# Patient Record
Sex: MALE | NOT HISPANIC OR LATINO | Employment: UNEMPLOYED
[De-identification: names, ages, dates, MRNs, and addresses within clinical notes are randomized per-mention and may not be internally consistent; named-entity substitution may affect disease eponyms.]

---

## 2017-09-19 ENCOUNTER — PRIOR ORIGINAL RECORDS (OUTPATIENT)
Dept: OTHER | Age: 37
End: 2017-09-19

## 2017-12-12 ENCOUNTER — PRIOR ORIGINAL RECORDS (OUTPATIENT)
Dept: OTHER | Age: 37
End: 2017-12-12

## 2017-12-31 ENCOUNTER — PRIOR ORIGINAL RECORDS (OUTPATIENT)
Dept: OTHER | Age: 37
End: 2017-12-31

## 2018-09-18 ENCOUNTER — PRIOR ORIGINAL RECORDS (OUTPATIENT)
Dept: OTHER | Age: 38
End: 2018-09-18

## 2018-12-31 ENCOUNTER — PRIOR ORIGINAL RECORDS (OUTPATIENT)
Dept: OTHER | Age: 38
End: 2018-12-31

## 2019-10-11 ENCOUNTER — PRIOR ORIGINAL RECORDS (OUTPATIENT)
Dept: OTHER | Age: 39
End: 2019-10-11

## 2019-12-31 ENCOUNTER — PRIOR ORIGINAL RECORDS (OUTPATIENT)
Dept: OTHER | Age: 39
End: 2019-12-31

## 2020-10-09 LAB
ALBUMIN/GLOB SERPL: 1.8 (CALC) (ref 1–2.5)
ALBUMIN/GLOB SERPL: 1.8 (CALC) (ref 1–2.5)
ALBUMIN/GLOB SERPL: 2 (CALC) (ref 1–2.5)
ALBUMIN: 4.3 G/DL (ref 3.6–5.1)
ALBUMIN: 4.4 G/DL (ref 3.6–5.1)
ALBUMIN: 4.7 G/DL (ref 3.6–5.1)
ALKALINE PHOSPHATASE: 119 UNIT/L (ref 40–115)
ALKALINE PHOSPHATASE: 40 UNIT/L (ref 40–115)
ALKALINE PHOSPHATASE: 83 UNIT/L (ref 40–115)
ALT: 13 UNIT/L (ref 9–46)
ALT: 43 UNIT/L (ref 9–46)
ALT: 8 UNIT/L (ref 9–46)
AST: 11 UNIT/L (ref 10–40)
AST: 17 UNIT/L (ref 10–40)
AST: 32 UNIT/L (ref 10–40)
BASO%: 0.5 %
BASO%: 0.6 %
BASO%: 0.8 %
BASO%: 1.1 %
BASO: 0 10^3/UL
BILIRUBIN, TOTAL: 0.6 MG/DL (ref 0.2–1.2)
BILIRUBIN, TOTAL: 0.7 MG/DL (ref 0.2–1.2)
BILIRUBIN, TOTAL: 1 MG/DL (ref 0.2–1.2)
BUN/CREATININE RATIO: ABNORMAL (CALC) (ref 6–22)
BUN/CREATININE RATIO: ABNORMAL (CALC) (ref 6–22)
BUN/CREATININE RATIO: NORMAL (CALC) (ref 6–22)
CALCIUM: 9.4 MG/DL (ref 8.6–10.3)
CALCIUM: 9.5 MG/DL (ref 8.6–10.3)
CALCIUM: 9.7 MG/DL (ref 8.6–10.3)
CARBON DIOXIDE: 23 MMOL/L (ref 20–32)
CARBON DIOXIDE: 23 MMOL/L (ref 20–32)
CARBON DIOXIDE: 26 MMOL/L (ref 20–31)
CHLORIDE: 103 MMOL/L (ref 98–110)
CHLORIDE: 104 MMOL/L (ref 98–110)
CHLORIDE: 106 MMOL/L (ref 98–110)
CRCL (C&G) (MOSAIQ HL): 88.71 ML/MIN
CRCL (C&G) (MOSAIQ HL): 92.5 ML/MIN
CRCL (C&G) (MOSAIQ HL): 94.75 ML/MIN
CREATININE CLEARANCE (MOSAIQ HL): 83.7 ML/MIN
CREATININE CLEARANCE (MOSAIQ HL): 90 ML/MIN
CREATININE CLEARANCE (MOSAIQ HL): 91.9 ML/MIN
CREATININE: 0.91 MG/DL (ref 0.6–1.35)
CREATININE: 0.92 MG/DL (ref 0.6–1.35)
CREATININE: 0.98 MG/DL (ref 0.6–1.35)
EGFR AFRICAN AMERICAN: 111 ML/MIN/1.73M2
EGFR AFRICAN AMERICAN: 121 ML/MIN/1.73M2
EGFR AFRICAN AMERICAN: 123 ML/MIN/1.73M2
EGFR NON-AFR. AMERICAN: 104 ML/MIN/1.73M2
EGFR NON-AFR. AMERICAN: 107 ML/MIN/1.73M2
EGFR NON-AFR. AMERICAN: 96 ML/MIN/1.73M2
EOS%: 1.1 %
EOS%: 1.5 %
EOS%: 3 %
EOS%: 3.8 %
EOS: 0 10^3/UL
EOS: 0.1 10^3/UL
EOS: 0.1 10^3/UL
EOS: 0.2 10^3/UL
GLOBULIN: 2.4 G/DL (CALC) (ref 1.9–3.7)
GLUCOSE: 78 MG/DL (ref 65–99)
GLUCOSE: 90 MG/DL (ref 65–99)
GLUCOSE: 91 MG/DL (ref 65–99)
HCT: 35.3 % (ref 38–54)
HCT: 40.5 % (ref 38–54)
HCT: 40.8 % (ref 38–54)
HCT: 41.1 % (ref 38–54)
HGB: 12.3 G/DL (ref 12–18)
HGB: 13.8 G/DL (ref 12–18)
HGB: 13.9 G/DL (ref 12–18)
HGB: 14.2 G/DL (ref 12–18)
IG A: 141 MG/DL (ref 47–310)
IG G: 1157 MG/DL (ref 600–1640)
IG M: 101 MG/DL (ref 50–300)
LYMPH%: 34.7 % (ref 12–44)
LYMPH%: 36 % (ref 12–44)
LYMPH%: 39.8 % (ref 12–44)
LYMPH%: 43.6 % (ref 12–44)
LYMPH: 1.2 10^3/UL (ref 0.8–2.8)
LYMPH: 1.3 10^3/UL (ref 0.8–2.8)
LYMPH: 1.5 10^3/UL (ref 0.8–2.8)
LYMPH: 1.7 10^3/UL (ref 0.8–2.8)
MCH: 30.1 PG (ref 26–33)
MCH: 30.5 PG (ref 26–33)
MCH: 30.5 PG (ref 26–33)
MCH: 30.8 PG (ref 26–33)
MCHC: 33.8 G/DL (ref 31–36)
MCHC: 33.8 G/DL (ref 31–36)
MCHC: 34.8 G/DL (ref 31–36)
MCHC: 35.1 G/DL (ref 31–36)
MCV: 87.1 FML (ref 82–100)
MCV: 87.6 FML (ref 82–100)
MCV: 89.1 FML (ref 82–100)
MCV: 90.9 FML (ref 82–100)
MONO%: 7.1 % (ref 2–12)
MONO%: 7.6 % (ref 2–12)
MONO%: 8.1 % (ref 2–12)
MONO%: 8.7 % (ref 2–12)
MONO: 0.2 10^3/UL (ref 0.2–1)
MONO: 0.3 10^3/UL (ref 0.2–1)
MPV: 11.8 FML (ref 8.6–11.7)
MPV: 8.9 FML (ref 8.6–11.7)
MPV: 9.8 FML (ref 8.6–11.7)
MPV: 9.9 FML (ref 8.6–11.7)
NEUT%: 44.5 % (ref 47–76)
NEUT%: 48 % (ref 47–76)
NEUT%: 54.7 % (ref 47–76)
NEUT%: 54.8 % (ref 47–76)
NEUT: 1.8 10^3/UL (ref 1.5–7.1)
NEUT: 2.1 10^3/UL (ref 1.5–7.1)
PLT: 111 10^3/UL (ref 150–375)
PLT: 194 10^3/UL (ref 150–375)
PLT: 203 10^3/UL (ref 150–375)
PLT: 246 10^3/UL (ref 150–375)
POTASSIUM: 3.8 MMOL/L (ref 3.5–5.3)
POTASSIUM: 4 MMOL/L (ref 3.5–5.3)
POTASSIUM: 4.7 MMOL/L (ref 3.5–5.3)
PROTEIN, TOTAL: 6.7 G/DL (ref 6.1–8.1)
PROTEIN, TOTAL: 6.8 G/DL (ref 6.1–8.1)
PROTEIN, TOTAL: 7.1 G/DL (ref 6.1–8.1)
RBC: 4.03 10^6/UL (ref 4.2–6.2)
RBC: 4.52 10^6/UL (ref 4.2–6.2)
RBC: 4.58 10^6/UL (ref 4.2–6.2)
RBC: 4.65 10^6/UL (ref 4.2–6.2)
RDW-CV: 11.4 %
RDW-CV: 11.7 %
RDW-CV: 12.3 %
RDW-CV: 12.4 %
RDW-SD: 36.4 FML (ref 36–50)
RDW-SD: 37.2 FML (ref 36–50)
RDW-SD: 39.1 FML (ref 36–50)
RDW-SD: 40.6 FML (ref 36–50)
SODIUM: 140 MMOL/L (ref 135–146)
UREA NITROGEN (BUN): 14 MG/DL (ref 7–25)
UREA NITROGEN (BUN): 15 MG/DL (ref 7–25)
UREA NITROGEN (BUN): 17 MG/DL (ref 7–25)
VITAMIN D, 25-OH, TOTAL: 34 NG/ML (ref 30–100)
WBC: 3.3 10^3/UL (ref 4.3–11)
WBC: 3.7 10^3/UL (ref 4.3–11)
WBC: 3.8 10^3/UL (ref 4.3–11)
WBC: 4 10^3/UL (ref 4.3–11)

## 2020-10-11 VITALS
BODY MASS INDEX: 20.56 KG/M2 | WEIGHT: 131 LBS | SYSTOLIC BLOOD PRESSURE: 100 MMHG | HEIGHT: 67 IN | DIASTOLIC BLOOD PRESSURE: 64 MMHG

## 2020-10-11 VITALS
SYSTOLIC BLOOD PRESSURE: 120 MMHG | HEIGHT: 67 IN | DIASTOLIC BLOOD PRESSURE: 78 MMHG | WEIGHT: 136.99 LBS | BODY MASS INDEX: 21.5 KG/M2

## 2020-10-11 VITALS
SYSTOLIC BLOOD PRESSURE: 119 MMHG | HEIGHT: 67 IN | WEIGHT: 138.01 LBS | BODY MASS INDEX: 21.66 KG/M2 | DIASTOLIC BLOOD PRESSURE: 72 MMHG

## 2020-10-20 ENCOUNTER — LAB SERVICES (OUTPATIENT)
Dept: HEMATOLOGY/ONCOLOGY | Age: 40
End: 2020-10-20
Attending: INTERNAL MEDICINE

## 2020-10-20 ENCOUNTER — OFFICE VISIT (OUTPATIENT)
Dept: HEMATOLOGY/ONCOLOGY | Age: 40
End: 2020-10-20
Attending: INTERNAL MEDICINE

## 2020-10-20 VITALS
HEART RATE: 60 BPM | WEIGHT: 149 LBS | BODY MASS INDEX: 23.34 KG/M2 | SYSTOLIC BLOOD PRESSURE: 119 MMHG | DIASTOLIC BLOOD PRESSURE: 68 MMHG

## 2020-10-20 DIAGNOSIS — D70.0 CONGENITAL NEUTROPENIA (CMD): Primary | ICD-10-CM

## 2020-10-20 PROBLEM — D72.819 LEUKOPENIA: Status: ACTIVE | Noted: 2020-10-20

## 2020-10-20 PROCEDURE — 36415 COLL VENOUS BLD VENIPUNCTURE: CPT

## 2020-10-20 PROCEDURE — 99214 OFFICE O/P EST MOD 30 MIN: CPT | Performed by: INTERNAL MEDICINE

## 2020-10-20 PROCEDURE — 85025 COMPLETE CBC W/AUTO DIFF WBC: CPT

## 2020-10-20 RX ORDER — PANTOPRAZOLE SODIUM 40 MG/1
TABLET, DELAYED RELEASE ORAL
COMMUNITY
Start: 2020-09-30 | End: 2022-10-18 | Stop reason: ALTCHOICE

## 2020-10-20 SDOH — HEALTH STABILITY: MENTAL HEALTH: HOW MANY STANDARD DRINKS CONTAINING ALCOHOL DO YOU HAVE ON A TYPICAL DAY?: 1 OR 2

## 2020-10-20 SDOH — HEALTH STABILITY: MENTAL HEALTH: HOW OFTEN DO YOU HAVE A DRINK CONTAINING ALCOHOL?: MONTHLY OR LESS

## 2020-10-20 SDOH — HEALTH STABILITY: MENTAL HEALTH: HOW OFTEN DO YOU HAVE 6 OR MORE DRINKS ON ONE OCCASION?: NEVER

## 2020-10-20 ASSESSMENT — PATIENT HEALTH QUESTIONNAIRE - PHQ9
1. LITTLE INTEREST OR PLEASURE IN DOING THINGS: NOT AT ALL
SUM OF ALL RESPONSES TO PHQ9 QUESTIONS 1 AND 2: 0
CLINICAL INTERPRETATION OF PHQ9 SCORE: NO FURTHER SCREENING NEEDED
2. FEELING DOWN, DEPRESSED OR HOPELESS: NOT AT ALL
CLINICAL INTERPRETATION OF PHQ2 SCORE: NO FURTHER SCREENING NEEDED
SUM OF ALL RESPONSES TO PHQ9 QUESTIONS 1 AND 2: 0

## 2020-10-20 ASSESSMENT — ENCOUNTER SYMPTOMS
ABDOMINAL DISTENTION: 0
DIZZINESS: 0
APPETITE CHANGE: 0
DIARRHEA: 0
WHEEZING: 0
CONFUSION: 0
SPEECH DIFFICULTY: 0
LIGHT-HEADEDNESS: 0
CHEST TIGHTNESS: 0
CHOKING: 0
DIAPHORESIS: 0
CHILLS: 0
HEADACHES: 0
ACTIVITY CHANGE: 0
ADENOPATHY: 0
FEVER: 0
APNEA: 0
CONSTIPATION: 0
VOICE CHANGE: 0
BACK PAIN: 0
VOMITING: 0
BLOOD IN STOOL: 0
WEAKNESS: 0
SHORTNESS OF BREATH: 0
BRUISES/BLEEDS EASILY: 0
ABDOMINAL PAIN: 0
SLEEP DISTURBANCE: 0
TROUBLE SWALLOWING: 0
NAUSEA: 0
COUGH: 1
FATIGUE: 0
UNEXPECTED WEIGHT CHANGE: 0

## 2020-10-21 LAB
ALBUMIN SERPL-MCNC: 4.5 G/DL (ref 3.6–5.1)
ALBUMIN/GLOB SERPL: 1.4 {RATIO} (ref 1–2.4)
ALP SERPL-CCNC: 71 UNITS/L (ref 45–117)
ALT SERPL-CCNC: 22 UNITS/L
ANION GAP SERPL CALC-SCNC: 10 MMOL/L (ref 10–20)
AST SERPL-CCNC: 22 UNITS/L
BILIRUB SERPL-MCNC: 0.5 MG/DL (ref 0.2–1)
BUN SERPL-MCNC: 20 MG/DL (ref 6–20)
BUN/CREAT SERPL: 22 (ref 7–25)
CALCIUM SERPL-MCNC: 9.3 MG/DL (ref 8.4–10.2)
CHLORIDE SERPL-SCNC: 107 MMOL/L (ref 98–107)
CO2 SERPL-SCNC: 26 MMOL/L (ref 21–32)
CREAT SERPL-MCNC: 0.91 MG/DL (ref 0.67–1.17)
GLOBULIN SER-MCNC: 3.2 G/DL (ref 2–4)
GLUCOSE SERPL-MCNC: 77 MG/DL (ref 65–99)
LENGTH OF FAST TIME PATIENT: NORMAL HRS
POTASSIUM SERPL-SCNC: 4.4 MMOL/L (ref 3.4–5.1)
PROT SERPL-MCNC: 7.7 G/DL (ref 6.4–8.2)
SODIUM SERPL-SCNC: 139 MMOL/L (ref 135–145)

## 2021-06-19 ENCOUNTER — HOSPITAL ENCOUNTER (OUTPATIENT)
Dept: CT IMAGING | Facility: HOSPITAL | Age: 41
Discharge: HOME | End: 2021-06-19
Attending: INTERNAL MEDICINE
Payer: MEDICARE

## 2021-06-19 DIAGNOSIS — R05.9 COUGH: ICD-10-CM

## 2021-06-19 PROCEDURE — 71250 CT THORAX DX C-: CPT | Performed by: INTERNAL MEDICINE

## 2021-06-22 NOTE — PROGRESS NOTES
Please let pt know that his CT scan is completely normal.  Has he noted any change/improvement in his symptoms since we saw each other in the office?   thanks

## 2021-06-24 NOTE — PROGRESS NOTES
Pt notified of results, states symptoms have not changed or improved since LOV.  Please advise on next steps

## 2021-06-24 NOTE — PROGRESS NOTES
Let's have the patient get a CPET through [facility] or [facility][note 2] whichever he prefers.   thanks

## 2021-06-25 ENCOUNTER — TELEPHONE (OUTPATIENT)
Dept: CARDIOLOGY | Age: 41
End: 2021-06-25

## 2021-06-25 ENCOUNTER — OFF PREMISE (OUTPATIENT)
Dept: CARDIOLOGY | Age: 41
End: 2021-06-25

## 2021-06-25 DIAGNOSIS — R05.9 COUGH: Primary | ICD-10-CM

## 2021-06-25 NOTE — PROGRESS NOTES
Spoke with pt: instructed per provider's orders and comments. He verbalized understanding, was agreeable to plan and prefers to go to [facility] location. Instructed on scheduling info for [facility] to have Cardiopulm stress test done.   Order with last OV not

## 2021-07-28 ENCOUNTER — ANCILLARY PROCEDURE (OUTPATIENT)
Dept: CARDIOLOGY | Age: 41
End: 2021-07-28
Attending: INTERNAL MEDICINE

## 2021-07-28 VITALS — HEIGHT: 67 IN | BODY MASS INDEX: 23.39 KG/M2 | WEIGHT: 149 LBS

## 2021-07-28 DIAGNOSIS — R05.9 COUGH: ICD-10-CM

## 2021-07-28 PROCEDURE — 94621 CARDIOPULM EXERCISE TESTING: CPT | Performed by: INTERNAL MEDICINE

## 2021-07-28 ASSESSMENT — EXERCISE STRESS TEST
STAGE_CATEGORIES: 3
COMMENTS: RPE:16
GRADE: 4
PEAK_BP: 120/70
PEAK_RPP: 23520
MPH: 5.4
GRADE: 2
STAGE_CATEGORIES: 1
PEAK_HR: 146
PEAK_RPP: 6960
PEAK_HR: 83
GRADE: 6
STAGE_CATEGORIES: RECOVERY 1
STAGE_CATEGORIES: RECOVERY 2
PEAK_METS: 10.1
COMMENTS: RPE:19
PEAK_HR: 90
PEAK_HR: 93
PEAK_BP: 160/74
MPH: 3
PEAK_HR: 146
PEAK_HR: 140
PEAK_BP: 140/70
STAGE_CATEGORIES: 4
PEAK_RPP: 9130
STAGE_CATEGORIES: RECOVERY 0
PEAK_RPP: 14880
PEAK_HR: 103
PEAK_BP: 110/70
STAGE_CATEGORIES: 2
PEAK_BP: 168/82
PEAK_O2_SAT: 98
PEAK_HR: 58
STAGE_CATEGORIES: RESTING
STOPPAGE_REASON: GENERAL FATIGUE
PEAK_RPP: 14420
PEAK_BP: 120/64
COMMENTS: RPE:13
MPH: 4.2
PEAK_RPP: 10800
PEAK_O2_SAT: 98

## 2021-08-04 LAB
BODY MASS INDEX: 23.5
BREATHING RESERVE (CALCULATED) PREDICTED: 38.93 %
BREATHING RESERVE (MEASURED) ACHIEVED: 48.6 %
CHANGE VO2/ CHANGE WR ACHIEVED: 18 ML/MIN/WATT
CHRONOTROPIC INDEX PREDICTED: 0.81
HEART RATE RESERVE PREDICTED: 17.98 BPM
HEIGHT: 170 CM
IDEAL BODY WEIGHT: 74 KG
METS ACHIEVED: 10.1
O2 SATURATION ACHIEVED: 97 %
OUES ACHIEVED: 80
PEAK HR ACHIEVED: 146 BPM
PEAK HR PREDICTED: 178 BPM
PEAK O2 PULSE (%) PREDICTED: 110.14 %
PEAK O2 PULSE (ML/BEAT) ACHIEVED: 16.4 ML/BEAT
PEAK O2 PULSE (ML/BEAT) PREDICTED: 14.89 ML/BEAT
PEAK RESPIRATORY RATE ACHIEVED: 26 BRS/MIN
PEAK VE ACHIEVED: 80 L/MIN
PECO2 ACHIEVED: 32.5 MMHG
PECO2/PETCO2 AT PREDICTED: 81.25 %
PETCO2 ACHIEVED: 40 MMHG
PREDICTED VO2 % AT AT: 43.33 %
PREDICTED VO2 %: 90.26 %
RER MAX ACHIEVED: 1.17
RESTING HR ACHIEVED: 61 BPM
RESTING MVV: 131 L/MIN
STRESS BASELINE BP: NORMAL MMHG
STRESS O2 SAT REST: 98 %
STRESS PEAK HR: 146 BPM
STRESS PERCENT HR: 82 %
STRESS POST ESTIMATED WORKLOAD: 10.1 METS
STRESS POST EXERCISE DUR MIN: 6 MIN
STRESS POST EXERCISE DUR SEC: 28 SEC
STRESS POST O2 SAT PEAK: 97 %
STRESS POST PEAK BP: NORMAL MMHG
STRESS TARGET HR: 178 BPM
VD/VT ACHIEVED: 0.24
VE/VCO2 AT ACHIEVED: 27
VE/VO2 AT ACHIEVED: 23
VO2 AT ACHIEVED: 16.9 ML/KG/MIN
VO2 AT AT (ML/MIN) ACHIEVED: 1147 ML/MIN
VO2 AT, IBW ACHIEVED: 15.5 ML/KG/MIN
VO2 PEAK (ML/KG/MIN) ACHIEVED: 35.2 ML/KG/MIN
VO2 PEAK (ML/KG/MIN) PREDICTED: 39 ML/KG/MIN
VO2 PEAK (ML/MIN) ACHIEVED: 2395 ML/MIN
VO2 PEAK (ML/MIN) PREDICTED: 2651 ML/MIN
VO2 PEAK IBW ACHIEVED: 32.36 ML/KG/MIN
VT/IC PREDICTED: 119 %
WEIGHT MEASUREMENT: 68 KG

## 2021-10-20 ENCOUNTER — HOSPITAL ENCOUNTER (OUTPATIENT)
Dept: LAB | Age: 41
Discharge: STILL ADMITTED OR ADMINISTRATIVE | End: 2021-10-20
Attending: INTERNAL MEDICINE

## 2021-10-20 ENCOUNTER — OFFICE VISIT (OUTPATIENT)
Dept: HEMATOLOGY/ONCOLOGY | Age: 41
End: 2021-10-20
Attending: INTERNAL MEDICINE

## 2021-10-20 VITALS
DIASTOLIC BLOOD PRESSURE: 79 MMHG | HEART RATE: 69 BPM | OXYGEN SATURATION: 98 % | SYSTOLIC BLOOD PRESSURE: 123 MMHG | WEIGHT: 150.6 LBS | BODY MASS INDEX: 23.59 KG/M2

## 2021-10-20 DIAGNOSIS — D70.0 CONGENITAL NEUTROPENIA (CMD): Primary | ICD-10-CM

## 2021-10-20 DIAGNOSIS — D70.0 CONGENITAL NEUTROPENIA (CMD): ICD-10-CM

## 2021-10-20 LAB
ALBUMIN SERPL-MCNC: 4.3 G/DL (ref 3.6–5.1)
ALBUMIN/GLOB SERPL: 1.3 {RATIO} (ref 1–2.4)
ALP SERPL-CCNC: 60 UNITS/L (ref 45–117)
ALT SERPL-CCNC: 30 UNITS/L
ANION GAP SERPL CALC-SCNC: 9 MMOL/L (ref 10–20)
AST SERPL-CCNC: 37 UNITS/L
BASOPHILS # BLD: 0 K/MCL (ref 0–0.3)
BASOPHILS NFR BLD: 1 %
BILIRUB SERPL-MCNC: 0.7 MG/DL (ref 0.2–1)
BUN SERPL-MCNC: 18 MG/DL (ref 6–20)
BUN/CREAT SERPL: 17 (ref 7–25)
CALCIUM SERPL-MCNC: 9.1 MG/DL (ref 8.4–10.2)
CHLORIDE SERPL-SCNC: 105 MMOL/L (ref 98–107)
CO2 SERPL-SCNC: 29 MMOL/L (ref 21–32)
CREAT SERPL-MCNC: 1.03 MG/DL (ref 0.67–1.17)
DEPRECATED RDW RBC: 40.2 FL (ref 39–50)
EOSINOPHIL # BLD: 0 K/MCL (ref 0–0.5)
EOSINOPHIL NFR BLD: 1 %
ERYTHROCYTE [DISTWIDTH] IN BLOOD: 12 % (ref 11–15)
FASTING DURATION TIME PATIENT: ABNORMAL H
GFR SERPLBLD BASED ON 1.73 SQ M-ARVRAT: 89 ML/MIN
GLOBULIN SER-MCNC: 3.3 G/DL (ref 2–4)
GLUCOSE SERPL-MCNC: 89 MG/DL (ref 70–99)
HCT VFR BLD CALC: 41.2 % (ref 39–51)
HGB BLD-MCNC: 13.6 G/DL (ref 13–17)
IMM GRANULOCYTES # BLD AUTO: 0 K/MCL (ref 0–0.2)
IMM GRANULOCYTES # BLD: 0 %
LYMPHOCYTES # BLD: 1.1 K/MCL (ref 1–4.8)
LYMPHOCYTES NFR BLD: 19 %
MCH RBC QN AUTO: 30.1 PG (ref 26–34)
MCHC RBC AUTO-ENTMCNC: 33 G/DL (ref 32–36.5)
MCV RBC AUTO: 91.2 FL (ref 78–100)
MONOCYTES # BLD: 0.4 K/MCL (ref 0.3–0.9)
MONOCYTES NFR BLD: 7 %
NEUTROPHILS # BLD: 4.1 K/MCL (ref 1.8–7.7)
NEUTROPHILS NFR BLD: 72 %
NRBC BLD MANUAL-RTO: 0 /100 WBC
PLATELET # BLD AUTO: 220 K/MCL (ref 140–450)
POTASSIUM SERPL-SCNC: 4.5 MMOL/L (ref 3.4–5.1)
PROT SERPL-MCNC: 7.6 G/DL (ref 6.4–8.2)
RBC # BLD: 4.52 MIL/MCL (ref 4.5–5.9)
SODIUM SERPL-SCNC: 138 MMOL/L (ref 135–145)
WBC # BLD: 5.7 K/MCL (ref 4.2–11)

## 2021-10-20 PROCEDURE — 36415 COLL VENOUS BLD VENIPUNCTURE: CPT | Performed by: INTERNAL MEDICINE

## 2021-10-20 PROCEDURE — 99211 OFF/OP EST MAY X REQ PHY/QHP: CPT

## 2021-10-20 PROCEDURE — 99214 OFFICE O/P EST MOD 30 MIN: CPT | Performed by: INTERNAL MEDICINE

## 2021-10-20 PROCEDURE — 80053 COMPREHEN METABOLIC PANEL: CPT | Performed by: INTERNAL MEDICINE

## 2021-10-20 PROCEDURE — 85025 COMPLETE CBC W/AUTO DIFF WBC: CPT | Performed by: INTERNAL MEDICINE

## 2021-10-20 ASSESSMENT — ENCOUNTER SYMPTOMS
WEAKNESS: 0
WHEEZING: 0
VOMITING: 0
CHEST TIGHTNESS: 0
SHORTNESS OF BREATH: 0
TROUBLE SWALLOWING: 0
LIGHT-HEADEDNESS: 0
BLOOD IN STOOL: 0
HEADACHES: 0
FEVER: 0
CONFUSION: 0
COUGH: 1
CHOKING: 0
DIAPHORESIS: 0
CHILLS: 0
FATIGUE: 0
ABDOMINAL PAIN: 0
ABDOMINAL DISTENTION: 0
VOICE CHANGE: 0
SPEECH DIFFICULTY: 0
CONSTIPATION: 0
BACK PAIN: 0
APNEA: 0
ACTIVITY CHANGE: 0
SLEEP DISTURBANCE: 0
DIARRHEA: 0
BRUISES/BLEEDS EASILY: 0
NAUSEA: 0
UNEXPECTED WEIGHT CHANGE: 0
APPETITE CHANGE: 0
DIZZINESS: 0
ADENOPATHY: 0

## 2021-12-28 ENCOUNTER — TELEPHONE (OUTPATIENT)
Dept: SCHEDULING | Age: 41
End: 2021-12-28

## 2021-12-30 ENCOUNTER — NURSE ONLY (OUTPATIENT)
Dept: URGENT CARE | Age: 41
End: 2021-12-30

## 2021-12-30 DIAGNOSIS — Z11.52 ENCOUNTER FOR SCREENING LABORATORY TESTING FOR COVID-19 VIRUS IN ASYMPTOMATIC PATIENT: Primary | ICD-10-CM

## 2021-12-30 DIAGNOSIS — Z01.812 ENCOUNTER FOR SCREENING LABORATORY TESTING FOR COVID-19 VIRUS IN ASYMPTOMATIC PATIENT: Primary | ICD-10-CM

## 2021-12-30 PROCEDURE — U0005 INFEC AGEN DETEC AMPLI PROBE: HCPCS | Performed by: CLINICAL MEDICAL LABORATORY

## 2021-12-30 PROCEDURE — U0003 INFECTIOUS AGENT DETECTION BY NUCLEIC ACID (DNA OR RNA); SEVERE ACUTE RESPIRATORY SYNDROME CORONAVIRUS 2 (SARS-COV-2) (CORONAVIRUS DISEASE [COVID-19]), AMPLIFIED PROBE TECHNIQUE, MAKING USE OF HIGH THROUGHPUT TECHNOLOGIES AS DESCRIBED BY CMS-2020-01-R: HCPCS | Performed by: CLINICAL MEDICAL LABORATORY

## 2021-12-31 LAB
SARS-COV-2 RNA RESP QL NAA+PROBE: NOT DETECTED
SERVICE CMNT-IMP: NORMAL
SERVICE CMNT-IMP: NORMAL

## 2022-10-18 ENCOUNTER — OFFICE VISIT (OUTPATIENT)
Dept: HEMATOLOGY/ONCOLOGY | Age: 42
End: 2022-10-18
Attending: INTERNAL MEDICINE

## 2022-10-18 ENCOUNTER — HOSPITAL ENCOUNTER (OUTPATIENT)
Dept: LAB | Age: 42
Discharge: STILL ADMITTED OR ADMINISTRATIVE | End: 2022-10-18
Attending: INTERNAL MEDICINE

## 2022-10-18 VITALS
DIASTOLIC BLOOD PRESSURE: 78 MMHG | OXYGEN SATURATION: 97 % | TEMPERATURE: 97.7 F | WEIGHT: 155 LBS | HEART RATE: 61 BPM | SYSTOLIC BLOOD PRESSURE: 122 MMHG | HEIGHT: 67 IN | BODY MASS INDEX: 24.33 KG/M2

## 2022-10-18 DIAGNOSIS — D70.0 CONGENITAL NEUTROPENIA (CMD): ICD-10-CM

## 2022-10-18 DIAGNOSIS — D70.0 CONGENITAL NEUTROPENIA (CMD): Primary | ICD-10-CM

## 2022-10-18 DIAGNOSIS — E56.1 VITAMIN K DEFICIENCY: ICD-10-CM

## 2022-10-18 DIAGNOSIS — R05.9 COUGH, UNSPECIFIED TYPE: ICD-10-CM

## 2022-10-18 LAB
ALBUMIN SERPL-MCNC: 4.3 G/DL (ref 3.6–5.1)
ALBUMIN/GLOB SERPL: 1.2 {RATIO} (ref 1–2.4)
ALP SERPL-CCNC: 63 UNITS/L (ref 45–117)
ALT SERPL-CCNC: 29 UNITS/L
ANION GAP SERPL CALC-SCNC: 6 MMOL/L (ref 7–19)
AST SERPL-CCNC: 16 UNITS/L
BASOPHILS # BLD: 0 K/MCL (ref 0–0.3)
BASOPHILS NFR BLD: 1 %
BILIRUB SERPL-MCNC: 0.5 MG/DL (ref 0.2–1)
BUN SERPL-MCNC: 16 MG/DL (ref 6–20)
BUN/CREAT SERPL: 15 (ref 7–25)
CALCIUM SERPL-MCNC: 9.1 MG/DL (ref 8.4–10.2)
CHLORIDE SERPL-SCNC: 106 MMOL/L (ref 97–110)
CO2 SERPL-SCNC: 30 MMOL/L (ref 21–32)
CREAT SERPL-MCNC: 1.05 MG/DL (ref 0.67–1.17)
DEPRECATED RDW RBC: 41 FL (ref 39–50)
EOSINOPHIL # BLD: 0.1 K/MCL (ref 0–0.5)
EOSINOPHIL NFR BLD: 1 %
ERYTHROCYTE [DISTWIDTH] IN BLOOD: 12.3 % (ref 11–15)
FASTING DURATION TIME PATIENT: ABNORMAL H
FOLATE SERPL-MCNC: 17.1 NG/ML
GFR SERPLBLD BASED ON 1.73 SQ M-ARVRAT: 90 ML/MIN
GLOBULIN SER-MCNC: 3.5 G/DL (ref 2–4)
GLUCOSE SERPL-MCNC: 93 MG/DL (ref 70–99)
HCT VFR BLD CALC: 41.7 % (ref 39–51)
HGB BLD-MCNC: 13.9 G/DL (ref 13–17)
IGA SERPL-MCNC: 153 MG/DL (ref 70–400)
IGG SERPL-MCNC: 1150 MG/DL (ref 700–1600)
IGM SERPL-MCNC: 84 MG/DL (ref 40–230)
IMM GRANULOCYTES # BLD AUTO: 0 K/MCL (ref 0–0.2)
IMM GRANULOCYTES # BLD: 0 %
LYMPHOCYTES # BLD: 1.4 K/MCL (ref 1–4.8)
LYMPHOCYTES NFR BLD: 38 %
MCH RBC QN AUTO: 30.3 PG (ref 26–34)
MCHC RBC AUTO-ENTMCNC: 33.3 G/DL (ref 32–36.5)
MCV RBC AUTO: 91 FL (ref 78–100)
MONOCYTES # BLD: 0.3 K/MCL (ref 0.3–0.9)
MONOCYTES NFR BLD: 7 %
NEUTROPHILS # BLD: 2 K/MCL (ref 1.8–7.7)
NEUTROPHILS NFR BLD: 53 %
NRBC BLD MANUAL-RTO: 0 /100 WBC
PLATELET # BLD AUTO: 226 K/MCL (ref 140–450)
POTASSIUM SERPL-SCNC: 3.8 MMOL/L (ref 3.4–5.1)
PROT SERPL-MCNC: 7.8 G/DL (ref 6.4–8.2)
RAINBOW EXTRA TUBES HOLD SPECIMEN: NORMAL
RBC # BLD: 4.58 MIL/MCL (ref 4.5–5.9)
SODIUM SERPL-SCNC: 138 MMOL/L (ref 135–145)
VIT B12 SERPL-MCNC: 475 PG/ML (ref 211–911)
WBC # BLD: 3.8 K/MCL (ref 4.2–11)

## 2022-10-18 PROCEDURE — 86665 EPSTEIN-BARR CAPSID VCA: CPT | Performed by: INTERNAL MEDICINE

## 2022-10-18 PROCEDURE — 99214 OFFICE O/P EST MOD 30 MIN: CPT | Performed by: INTERNAL MEDICINE

## 2022-10-18 PROCEDURE — 85025 COMPLETE CBC W/AUTO DIFF WBC: CPT | Performed by: INTERNAL MEDICINE

## 2022-10-18 PROCEDURE — 36415 COLL VENOUS BLD VENIPUNCTURE: CPT | Performed by: INTERNAL MEDICINE

## 2022-10-18 PROCEDURE — 82607 VITAMIN B-12: CPT | Performed by: INTERNAL MEDICINE

## 2022-10-18 PROCEDURE — 84597 ASSAY OF VITAMIN K: CPT | Performed by: INTERNAL MEDICINE

## 2022-10-18 PROCEDURE — 80053 COMPREHEN METABOLIC PANEL: CPT | Performed by: INTERNAL MEDICINE

## 2022-10-18 PROCEDURE — 99211 OFF/OP EST MAY X REQ PHY/QHP: CPT

## 2022-10-18 PROCEDURE — 86663 EPSTEIN-BARR ANTIBODY: CPT | Performed by: INTERNAL MEDICINE

## 2022-10-18 PROCEDURE — 82784 ASSAY IGA/IGD/IGG/IGM EACH: CPT | Performed by: INTERNAL MEDICINE

## 2022-10-18 ASSESSMENT — ENCOUNTER SYMPTOMS
NAUSEA: 0
SLEEP DISTURBANCE: 0
BACK PAIN: 0
BLOOD IN STOOL: 0
SPEECH DIFFICULTY: 0
HEADACHES: 0
DIARRHEA: 0
ABDOMINAL PAIN: 0
WHEEZING: 0
ABDOMINAL DISTENTION: 0
SHORTNESS OF BREATH: 0
ADENOPATHY: 0
DIAPHORESIS: 0
DIZZINESS: 0
TROUBLE SWALLOWING: 0
CHILLS: 0
CONSTIPATION: 0
WEAKNESS: 0
LIGHT-HEADEDNESS: 0
VOMITING: 0
ACTIVITY CHANGE: 0
CONFUSION: 0
FEVER: 0
APNEA: 0
COUGH: 1
UNEXPECTED WEIGHT CHANGE: 0
FATIGUE: 0
BRUISES/BLEEDS EASILY: 0
CHOKING: 0
APPETITE CHANGE: 0
CHEST TIGHTNESS: 0
VOICE CHANGE: 0

## 2022-10-18 ASSESSMENT — PATIENT HEALTH QUESTIONNAIRE - PHQ9
SUM OF ALL RESPONSES TO PHQ9 QUESTIONS 1 AND 2: 0
1. LITTLE INTEREST OR PLEASURE IN DOING THINGS: NOT AT ALL
SUM OF ALL RESPONSES TO PHQ9 QUESTIONS 1 AND 2: 0
CLINICAL INTERPRETATION OF PHQ2 SCORE: NO FURTHER SCREENING NEEDED
2. FEELING DOWN, DEPRESSED OR HOPELESS: NOT AT ALL

## 2022-10-18 ASSESSMENT — PAIN SCALES - GENERAL: PAINLEVEL: 0

## 2022-10-19 LAB
EBV EA IGG SER-ACNC: 0.3 AI
EBV NA IGG SER-ACNC: >8 AI
EBV VCA IGG SER-ACNC: >8 AI
EBV VCA IGM SER-ACNC: <0.2 AI
RAINBOW EXTRA TUBES HOLD SPECIMEN: NORMAL

## 2022-10-22 LAB — PHYTONADIONE SERPL-MCNC: 1.85 NMOL/L (ref 0.22–4.88)

## 2022-10-24 ENCOUNTER — TELEPHONE (OUTPATIENT)
Dept: HEMATOLOGY/ONCOLOGY | Age: 42
End: 2022-10-24

## 2023-01-03 ENCOUNTER — WALK IN (OUTPATIENT)
Dept: URGENT CARE | Age: 43
End: 2023-01-03

## 2023-01-03 VITALS
WEIGHT: 160 LBS | TEMPERATURE: 97.6 F | DIASTOLIC BLOOD PRESSURE: 88 MMHG | SYSTOLIC BLOOD PRESSURE: 144 MMHG | BODY MASS INDEX: 25.06 KG/M2 | OXYGEN SATURATION: 98 % | RESPIRATION RATE: 18 BRPM | HEART RATE: 60 BPM

## 2023-01-03 DIAGNOSIS — J06.9 ACUTE URI: Primary | ICD-10-CM

## 2023-01-03 DIAGNOSIS — R05.1 ACUTE COUGH: ICD-10-CM

## 2023-01-03 DIAGNOSIS — R03.0 ELEVATED BLOOD-PRESSURE READING WITHOUT DIAGNOSIS OF HYPERTENSION: ICD-10-CM

## 2023-01-03 DIAGNOSIS — Z20.822 SUSPECTED COVID-19 VIRUS INFECTION: ICD-10-CM

## 2023-01-03 LAB
FLUAV RNA RESP QL NAA+PROBE: NOT DETECTED
FLUBV RNA RESP QL NAA+PROBE: NOT DETECTED
RSV AG NPH QL IA.RAPID: NOT DETECTED
SARS-COV-2 RNA RESP QL NAA+PROBE: NOT DETECTED

## 2023-01-03 PROCEDURE — 0241U POCT COVID/FLU/RSV PANEL: CPT | Performed by: NURSE PRACTITIONER

## 2023-01-03 PROCEDURE — 99203 OFFICE O/P NEW LOW 30 MIN: CPT | Performed by: NURSE PRACTITIONER

## 2023-01-03 RX ORDER — BENZONATATE 200 MG/1
200 CAPSULE ORAL 3 TIMES DAILY PRN
Qty: 21 CAPSULE | Refills: 0 | Status: SHIPPED | OUTPATIENT
Start: 2023-01-03 | End: 2023-01-10

## 2023-01-03 ASSESSMENT — ENCOUNTER SYMPTOMS
COUGH: 1
NEUROLOGICAL NEGATIVE: 1
CONSTITUTIONAL NEGATIVE: 1
GASTROINTESTINAL NEGATIVE: 1
EYES NEGATIVE: 1
PSYCHIATRIC NEGATIVE: 1

## 2023-01-03 ASSESSMENT — PAIN SCALES - GENERAL: PAINLEVEL: 7

## 2023-02-07 ENCOUNTER — TELEPHONE (OUTPATIENT)
Dept: CARDIOLOGY | Age: 43
End: 2023-02-07

## 2023-03-27 ENCOUNTER — OFFICE VISIT (OUTPATIENT)
Dept: CARDIOLOGY | Age: 43
End: 2023-03-27

## 2023-03-27 VITALS
WEIGHT: 176 LBS | DIASTOLIC BLOOD PRESSURE: 75 MMHG | SYSTOLIC BLOOD PRESSURE: 127 MMHG | HEART RATE: 66 BPM | HEIGHT: 67 IN | OXYGEN SATURATION: 98 % | BODY MASS INDEX: 27.62 KG/M2

## 2023-03-27 DIAGNOSIS — R00.2 PALPITATIONS: ICD-10-CM

## 2023-03-27 DIAGNOSIS — E55.9 VITAMIN D DEFICIENCY: Primary | ICD-10-CM

## 2023-03-27 PROCEDURE — 99204 OFFICE O/P NEW MOD 45 MIN: CPT | Performed by: INTERNAL MEDICINE

## 2023-03-27 RX ORDER — BENZONATATE 100 MG/1
100 CAPSULE ORAL 3 TIMES DAILY PRN
COMMUNITY

## 2023-03-27 RX ORDER — MONTELUKAST SODIUM 10 MG/1
10 TABLET ORAL NIGHTLY
COMMUNITY

## 2023-03-27 SDOH — HEALTH STABILITY: MENTAL HEALTH: DEPRESSION SCREENING SCORE: 1

## 2023-03-27 SDOH — HEALTH STABILITY: MENTAL HEALTH: PHQ2 INTERPRETATION: NO FURTHER SCREENING NEEDED

## 2023-03-27 SDOH — HEALTH STABILITY: PHYSICAL HEALTH: ON AVERAGE, HOW MANY DAYS PER WEEK DO YOU ENGAGE IN MODERATE TO STRENUOUS EXERCISE (LIKE A BRISK WALK)?: 6 DAYS

## 2023-03-27 SDOH — HEALTH STABILITY: MENTAL HEALTH: FEELING DOWN, DEPRESSED OR HOPELESS?: SEVERAL DAYS

## 2023-03-27 SDOH — HEALTH STABILITY: MENTAL HEALTH: LITTLE INTEREST OR PLEASURE IN ACTIVITY?: NOT AT ALL

## 2023-03-27 SDOH — HEALTH STABILITY: PHYSICAL HEALTH: ON AVERAGE, HOW MANY MINUTES DO YOU ENGAGE IN EXERCISE AT THIS LEVEL?: 60 MIN

## 2023-03-27 ASSESSMENT — PATIENT HEALTH QUESTIONNAIRE - PHQ9: SUM OF ALL RESPONSES TO PHQ9 QUESTIONS 1 AND 2: 1

## 2023-03-31 ENCOUNTER — ANCILLARY PROCEDURE (OUTPATIENT)
Dept: CARDIOLOGY | Age: 43
End: 2023-03-31
Attending: INTERNAL MEDICINE

## 2023-03-31 DIAGNOSIS — R00.2 PALPITATIONS: ICD-10-CM

## 2023-04-11 ENCOUNTER — ANCILLARY PROCEDURE (OUTPATIENT)
Dept: CARDIOLOGY | Age: 43
End: 2023-04-11
Attending: INTERNAL MEDICINE

## 2023-04-11 DIAGNOSIS — R00.2 PALPITATIONS: ICD-10-CM

## 2023-04-11 LAB
AORTIC VALVE AREA (AVA): 0.77
AV PEAK GRADIENT (AVPG): 6
AV PEAK VELOCITY (AVPV): 1.21
AV STENOSIS SEVERITY TEXT: NORMAL
AVI LVOT PEAK GRADIENT (LVOTMG): 0.7
E WAVE DECELARATION TIME (MDT): 8.8
INTERVENTRICULAR SEPTUM IN END DIASTOLE (IVSD): 1.96
LEFT INTERNAL DIMENSION IN SYSTOLE (LVSD): 0.8
LEFT VENTRICULAR INTERNAL DIMENSION IN DIASTOLE (LVDD): 2.8
LEFT VENTRICULAR POSTERIOR WALL IN END DIASTOLE (LVPW): 4.8
LV EF: NORMAL %
LVOT VTI (LVOTVTI): 1.1
MV E TISSUE VEL MED (MESV): 10.2
MV E WAVE VEL/E TISSUE VEL MED(MSR): 8.7
MV PEAK A VELOCITY (MVPAV): 183
MV PEAK E VELOCITY (MVPEV): 0.63
RV END SYSTOLIC LONGITUDINAL STRAIN FREE WALL (RVGS): 2.2
TRICUSPID VALVE PEAK REGURGITATION VELOCITY (TRPV): 2.8
TV ESTIMATED RIGHT ARTERIAL PRESSURE (RAP): 12.6

## 2023-04-11 PROCEDURE — 76376 3D RENDER W/INTRP POSTPROCES: CPT | Performed by: INTERNAL MEDICINE

## 2023-04-11 PROCEDURE — 93306 TTE W/DOPPLER COMPLETE: CPT | Performed by: INTERNAL MEDICINE

## 2023-04-25 ENCOUNTER — TELEPHONE (OUTPATIENT)
Dept: CARDIOLOGY | Age: 43
End: 2023-04-25

## 2023-05-02 PROCEDURE — 93272 ECG/REVIEW INTERPRET ONLY: CPT | Performed by: INTERNAL MEDICINE

## 2023-05-03 ENCOUNTER — TELEPHONE (OUTPATIENT)
Dept: CARDIOLOGY | Age: 43
End: 2023-05-03

## 2023-05-08 ENCOUNTER — OFFICE VISIT (OUTPATIENT)
Dept: CARDIOLOGY | Age: 43
End: 2023-05-08

## 2023-05-08 VITALS
DIASTOLIC BLOOD PRESSURE: 76 MMHG | BODY MASS INDEX: 26.37 KG/M2 | HEART RATE: 60 BPM | OXYGEN SATURATION: 97 % | WEIGHT: 168 LBS | HEIGHT: 67 IN | SYSTOLIC BLOOD PRESSURE: 128 MMHG

## 2023-05-08 DIAGNOSIS — E55.9 VITAMIN D DEFICIENCY: Primary | ICD-10-CM

## 2023-05-08 PROCEDURE — 99214 OFFICE O/P EST MOD 30 MIN: CPT | Performed by: INTERNAL MEDICINE

## 2023-05-08 RX ORDER — PANTOPRAZOLE SODIUM 40 MG/1
40 TABLET, DELAYED RELEASE ORAL DAILY
COMMUNITY
Start: 2023-03-22 | End: 2023-07-25

## 2023-05-08 RX ORDER — PREDNISONE 20 MG/1
20 TABLET ORAL DAILY
COMMUNITY
Start: 2023-03-21 | End: 2023-07-25

## 2023-05-08 RX ORDER — ELECTROLYTES/DEXTROSE
SOLUTION, ORAL ORAL DAILY
COMMUNITY
End: 2023-07-25

## 2023-05-08 SDOH — HEALTH STABILITY: PHYSICAL HEALTH: ON AVERAGE, HOW MANY DAYS PER WEEK DO YOU ENGAGE IN MODERATE TO STRENUOUS EXERCISE (LIKE A BRISK WALK)?: 5 DAYS

## 2023-05-08 SDOH — HEALTH STABILITY: PHYSICAL HEALTH: ON AVERAGE, HOW MANY MINUTES DO YOU ENGAGE IN EXERCISE AT THIS LEVEL?: 30 MIN

## 2023-05-08 ASSESSMENT — PATIENT HEALTH QUESTIONNAIRE - PHQ9
1. LITTLE INTEREST OR PLEASURE IN DOING THINGS: NOT AT ALL
CLINICAL INTERPRETATION OF PHQ2 SCORE: NO FURTHER SCREENING NEEDED
SUM OF ALL RESPONSES TO PHQ9 QUESTIONS 1 AND 2: 0
2. FEELING DOWN, DEPRESSED OR HOPELESS: NOT AT ALL
SUM OF ALL RESPONSES TO PHQ9 QUESTIONS 1 AND 2: 0

## 2023-05-15 ENCOUNTER — TELEPHONE (OUTPATIENT)
Dept: CARDIOLOGY | Age: 43
End: 2023-05-15

## 2023-05-25 ENCOUNTER — OFFICE VISIT (OUTPATIENT)
Dept: CARDIOLOGY | Age: 43
End: 2023-05-25

## 2023-05-25 VITALS
HEIGHT: 67 IN | WEIGHT: 162.37 LBS | BODY MASS INDEX: 25.48 KG/M2 | DIASTOLIC BLOOD PRESSURE: 87 MMHG | HEART RATE: 65 BPM | SYSTOLIC BLOOD PRESSURE: 128 MMHG

## 2023-05-25 DIAGNOSIS — R55 SYNCOPE, UNSPECIFIED SYNCOPE TYPE: Primary | ICD-10-CM

## 2023-05-25 DIAGNOSIS — R00.2 PALPITATIONS: ICD-10-CM

## 2023-05-25 PROCEDURE — 99205 OFFICE O/P NEW HI 60 MIN: CPT | Performed by: INTERNAL MEDICINE

## 2023-05-25 RX ORDER — MECLIZINE HCL 12.5 MG/1
TABLET ORAL
COMMUNITY
Start: 2023-05-18 | End: 2023-09-19

## 2023-05-25 ASSESSMENT — PATIENT HEALTH QUESTIONNAIRE - PHQ9
SUM OF ALL RESPONSES TO PHQ9 QUESTIONS 1 AND 2: 0
SUM OF ALL RESPONSES TO PHQ9 QUESTIONS 1 AND 2: 0
CLINICAL INTERPRETATION OF PHQ2 SCORE: NO FURTHER SCREENING NEEDED
1. LITTLE INTEREST OR PLEASURE IN DOING THINGS: NOT AT ALL
2. FEELING DOWN, DEPRESSED OR HOPELESS: NOT AT ALL

## 2023-05-25 ASSESSMENT — ENCOUNTER SYMPTOMS
FEVER: 0
HEMATOCHEZIA: 0
FOCAL WEAKNESS: 1
WEAKNESS: 1
COUGH: 1
CHILLS: 0
BRUISES/BLEEDS EASILY: 0
ALLERGIC/IMMUNOLOGIC COMMENTS: NO NEW FOOD ALLERGIES
SUSPICIOUS LESIONS: 0
WEIGHT GAIN: 0
WEIGHT LOSS: 0
SYNCOPE: 1
SHORTNESS OF BREATH: 1
BLURRED VISION: 0
HEMOPTYSIS: 0
DEPRESSION: 0

## 2023-05-26 DIAGNOSIS — R55 SYNCOPE, UNSPECIFIED SYNCOPE TYPE: ICD-10-CM

## 2023-05-26 DIAGNOSIS — R00.2 PALPITATIONS: ICD-10-CM

## 2023-05-26 PROCEDURE — 93000 ELECTROCARDIOGRAM COMPLETE: CPT | Performed by: INTERNAL MEDICINE

## 2023-06-08 ENCOUNTER — ANCILLARY PROCEDURE (OUTPATIENT)
Dept: CARDIOLOGY | Age: 43
End: 2023-06-08
Attending: INTERNAL MEDICINE

## 2023-06-08 DIAGNOSIS — R55 SYNCOPE, UNSPECIFIED SYNCOPE TYPE: ICD-10-CM

## 2023-06-08 DIAGNOSIS — R00.2 PALPITATIONS: ICD-10-CM

## 2023-06-08 PROCEDURE — 93784 AMBL BP MNTR W/SOFTWARE: CPT | Performed by: INTERNAL MEDICINE

## 2023-06-18 PROCEDURE — 93244 EXT ECG>48HR<7D REV&INTERPJ: CPT | Performed by: INTERNAL MEDICINE

## 2023-07-25 ENCOUNTER — OFFICE VISIT (OUTPATIENT)
Dept: CARDIOLOGY | Age: 43
End: 2023-07-25

## 2023-07-25 VITALS
SYSTOLIC BLOOD PRESSURE: 129 MMHG | HEIGHT: 67 IN | DIASTOLIC BLOOD PRESSURE: 77 MMHG | BODY MASS INDEX: 26.42 KG/M2 | HEART RATE: 60 BPM | WEIGHT: 168.32 LBS

## 2023-07-25 DIAGNOSIS — R55 SYNCOPE, UNSPECIFIED SYNCOPE TYPE: Primary | ICD-10-CM

## 2023-07-25 DIAGNOSIS — R00.2 PALPITATIONS: ICD-10-CM

## 2023-07-25 PROCEDURE — 99213 OFFICE O/P EST LOW 20 MIN: CPT | Performed by: INTERNAL MEDICINE

## 2023-07-25 SDOH — HEALTH STABILITY: MENTAL HEALTH: FEELING DOWN, DEPRESSED OR HOPELESS?: NOT AT ALL

## 2023-07-25 SDOH — HEALTH STABILITY: MENTAL HEALTH: PHQ2 INTERPRETATION: NO FURTHER SCREENING NEEDED

## 2023-07-25 SDOH — HEALTH STABILITY: PHYSICAL HEALTH: ON AVERAGE, HOW MANY DAYS PER WEEK DO YOU ENGAGE IN MODERATE TO STRENUOUS EXERCISE (LIKE A BRISK WALK)?: 6 DAYS

## 2023-07-25 SDOH — HEALTH STABILITY: MENTAL HEALTH: DEPRESSION SCREENING SCORE: 0

## 2023-07-25 SDOH — HEALTH STABILITY: MENTAL HEALTH: LITTLE INTEREST OR PLEASURE IN ACTIVITY?: NOT AT ALL

## 2023-07-25 SDOH — HEALTH STABILITY: PHYSICAL HEALTH: ON AVERAGE, HOW MANY MINUTES DO YOU ENGAGE IN EXERCISE AT THIS LEVEL?: 60 MIN

## 2023-07-25 ASSESSMENT — ENCOUNTER SYMPTOMS
COUGH: 1
ALLERGIC/IMMUNOLOGIC COMMENTS: NO NEW FOOD ALLERGIES
CHILLS: 0
SYNCOPE: 0
WEIGHT GAIN: 0
SHORTNESS OF BREATH: 0
HEMOPTYSIS: 0
LOSS OF BALANCE: 0
FEVER: 0
LIGHT-HEADEDNESS: 1
HEMATOCHEZIA: 0
SUSPICIOUS LESIONS: 0
BRUISES/BLEEDS EASILY: 0
WEIGHT LOSS: 0
DEPRESSION: 0

## 2023-07-25 ASSESSMENT — PATIENT HEALTH QUESTIONNAIRE - PHQ9: SUM OF ALL RESPONSES TO PHQ9 QUESTIONS 1 AND 2: 0

## 2023-09-19 ENCOUNTER — OFFICE VISIT (OUTPATIENT)
Dept: CARDIOLOGY | Age: 43
End: 2023-09-19

## 2023-09-19 VITALS
WEIGHT: 164.9 LBS | HEIGHT: 67 IN | OXYGEN SATURATION: 100 % | BODY MASS INDEX: 25.88 KG/M2 | HEART RATE: 60 BPM | DIASTOLIC BLOOD PRESSURE: 82 MMHG | SYSTOLIC BLOOD PRESSURE: 131 MMHG

## 2023-09-19 DIAGNOSIS — R55 SYNCOPE, UNSPECIFIED SYNCOPE TYPE: ICD-10-CM

## 2023-09-19 DIAGNOSIS — R00.2 PALPITATIONS: Primary | ICD-10-CM

## 2023-09-19 DIAGNOSIS — E55.9 VITAMIN D DEFICIENCY: ICD-10-CM

## 2023-09-19 PROCEDURE — 99213 OFFICE O/P EST LOW 20 MIN: CPT | Performed by: INTERNAL MEDICINE

## 2023-09-19 SDOH — HEALTH STABILITY: MENTAL HEALTH: DEPRESSION SCREENING SCORE: 1

## 2023-09-19 SDOH — HEALTH STABILITY: PHYSICAL HEALTH: ON AVERAGE, HOW MANY MINUTES DO YOU ENGAGE IN EXERCISE AT THIS LEVEL?: 90 MIN

## 2023-09-19 SDOH — HEALTH STABILITY: MENTAL HEALTH: FEELING DOWN, DEPRESSED OR HOPELESS?: SEVERAL DAYS

## 2023-09-19 SDOH — HEALTH STABILITY: PHYSICAL HEALTH: ON AVERAGE, HOW MANY DAYS PER WEEK DO YOU ENGAGE IN MODERATE TO STRENUOUS EXERCISE (LIKE A BRISK WALK)?: 6 DAYS

## 2023-09-19 SDOH — HEALTH STABILITY: MENTAL HEALTH: LITTLE INTEREST OR PLEASURE IN ACTIVITY?: NOT AT ALL

## 2023-09-19 SDOH — HEALTH STABILITY: MENTAL HEALTH: PHQ2 INTERPRETATION: NO FURTHER SCREENING NEEDED

## 2023-09-19 ASSESSMENT — PATIENT HEALTH QUESTIONNAIRE - PHQ9: SUM OF ALL RESPONSES TO PHQ9 QUESTIONS 1 AND 2: 1

## 2023-10-03 ENCOUNTER — EXTERNAL RECORD (OUTPATIENT)
Dept: HEALTH INFORMATION MANAGEMENT | Facility: OTHER | Age: 43
End: 2023-10-03

## 2023-10-06 DIAGNOSIS — E56.1 VITAMIN K DEFICIENCY: Primary | ICD-10-CM

## 2023-10-06 DIAGNOSIS — D70.0 CONGENITAL NEUTROPENIA (CMD): ICD-10-CM

## 2023-10-17 ENCOUNTER — OFFICE VISIT (OUTPATIENT)
Dept: HEMATOLOGY/ONCOLOGY | Age: 43
End: 2023-10-17
Attending: INTERNAL MEDICINE

## 2023-10-17 ENCOUNTER — LAB SERVICES (OUTPATIENT)
Dept: LAB | Age: 43
End: 2023-10-17
Attending: INTERNAL MEDICINE

## 2023-10-17 VITALS
BODY MASS INDEX: 25.25 KG/M2 | HEART RATE: 66 BPM | DIASTOLIC BLOOD PRESSURE: 88 MMHG | RESPIRATION RATE: 16 BRPM | OXYGEN SATURATION: 98 % | WEIGHT: 160.9 LBS | SYSTOLIC BLOOD PRESSURE: 132 MMHG | TEMPERATURE: 97.1 F | HEIGHT: 67 IN

## 2023-10-17 DIAGNOSIS — E56.1 VITAMIN K DEFICIENCY: ICD-10-CM

## 2023-10-17 DIAGNOSIS — D70.0 CONGENITAL NEUTROPENIA (CMD): ICD-10-CM

## 2023-10-17 DIAGNOSIS — R05.9 COUGH, UNSPECIFIED TYPE: ICD-10-CM

## 2023-10-17 DIAGNOSIS — D70.0 CONGENITAL NEUTROPENIA (CMD): Primary | ICD-10-CM

## 2023-10-17 LAB
ALBUMIN SERPL-MCNC: 4.2 G/DL (ref 3.6–5.1)
ALBUMIN/GLOB SERPL: 1.1 {RATIO} (ref 1–2.4)
ALP SERPL-CCNC: 82 UNITS/L (ref 45–117)
ALT SERPL-CCNC: 30 UNITS/L
ANION GAP SERPL CALC-SCNC: 7 MMOL/L (ref 7–19)
AST SERPL-CCNC: 18 UNITS/L
BASOPHILS # BLD: 0 K/MCL (ref 0–0.3)
BASOPHILS NFR BLD: 1 %
BILIRUB SERPL-MCNC: 0.7 MG/DL (ref 0.2–1)
BUN SERPL-MCNC: 17 MG/DL (ref 6–20)
BUN/CREAT SERPL: 17 (ref 7–25)
CALCIUM SERPL-MCNC: 9 MG/DL (ref 8.4–10.2)
CHLORIDE SERPL-SCNC: 108 MMOL/L (ref 97–110)
CO2 SERPL-SCNC: 26 MMOL/L (ref 21–32)
CREAT SERPL-MCNC: 0.98 MG/DL (ref 0.67–1.17)
DEPRECATED RDW RBC: 40.6 FL (ref 39–50)
EGFRCR SERPLBLD CKD-EPI 2021: >90 ML/MIN/{1.73_M2}
EOSINOPHIL # BLD: 0 K/MCL (ref 0–0.5)
EOSINOPHIL NFR BLD: 1 %
ERYTHROCYTE [DISTWIDTH] IN BLOOD: 12.2 % (ref 11–15)
FASTING DURATION TIME PATIENT: ABNORMAL H
FOLATE SERPL-MCNC: 10.8 NG/ML
GLOBULIN SER-MCNC: 3.7 G/DL (ref 2–4)
GLUCOSE SERPL-MCNC: 101 MG/DL (ref 70–99)
HCT VFR BLD CALC: 42.4 % (ref 39–51)
HGB BLD-MCNC: 14.3 G/DL (ref 13–17)
IGA SERPL-MCNC: 139 MG/DL (ref 70–400)
IGG SERPL-MCNC: 1060 MG/DL (ref 700–1600)
IGM SERPL-MCNC: 73 MG/DL (ref 40–230)
IMM GRANULOCYTES # BLD AUTO: 0 K/MCL (ref 0–0.2)
IMM GRANULOCYTES # BLD: 0 %
LYMPHOCYTES # BLD: 0.8 K/MCL (ref 1–4.8)
LYMPHOCYTES NFR BLD: 22 %
MCH RBC QN AUTO: 30.4 PG (ref 26–34)
MCHC RBC AUTO-ENTMCNC: 33.7 G/DL (ref 32–36.5)
MCV RBC AUTO: 90.2 FL (ref 78–100)
MONOCYTES # BLD: 0.3 K/MCL (ref 0.3–0.9)
MONOCYTES NFR BLD: 8 %
NEUTROPHILS # BLD: 2.4 K/MCL (ref 1.8–7.7)
NEUTROPHILS NFR BLD: 68 %
NRBC BLD MANUAL-RTO: 0 /100 WBC
PLATELET # BLD AUTO: 215 K/MCL (ref 140–450)
POTASSIUM SERPL-SCNC: 4 MMOL/L (ref 3.4–5.1)
PROT SERPL-MCNC: 7.9 G/DL (ref 6.4–8.2)
RBC # BLD: 4.7 MIL/MCL (ref 4.5–5.9)
SODIUM SERPL-SCNC: 137 MMOL/L (ref 135–145)
VIT B12 SERPL-MCNC: 1181 PG/ML (ref 211–911)
WBC # BLD: 3.6 K/MCL (ref 4.2–11)

## 2023-10-17 PROCEDURE — 80053 COMPREHEN METABOLIC PANEL: CPT

## 2023-10-17 PROCEDURE — 82784 ASSAY IGA/IGD/IGG/IGM EACH: CPT

## 2023-10-17 PROCEDURE — 99211 OFF/OP EST MAY X REQ PHY/QHP: CPT

## 2023-10-17 PROCEDURE — 85025 COMPLETE CBC W/AUTO DIFF WBC: CPT

## 2023-10-17 PROCEDURE — 82607 VITAMIN B-12: CPT

## 2023-10-17 PROCEDURE — 84597 ASSAY OF VITAMIN K: CPT

## 2023-10-17 PROCEDURE — 86665 EPSTEIN-BARR CAPSID VCA: CPT

## 2023-10-17 PROCEDURE — 36415 COLL VENOUS BLD VENIPUNCTURE: CPT

## 2023-10-17 PROCEDURE — 99214 OFFICE O/P EST MOD 30 MIN: CPT | Performed by: INTERNAL MEDICINE

## 2023-10-17 RX ORDER — GABAPENTIN 300 MG/1
CAPSULE ORAL
COMMUNITY
Start: 2023-10-03

## 2023-10-17 ASSESSMENT — ENCOUNTER SYMPTOMS
FATIGUE: 0
VOICE CHANGE: 0
ADENOPATHY: 0
CHILLS: 0
FEVER: 0
NAUSEA: 0
TROUBLE SWALLOWING: 0
APPETITE CHANGE: 0
ABDOMINAL DISTENTION: 0
VOMITING: 0
BRUISES/BLEEDS EASILY: 0
SHORTNESS OF BREATH: 0
SLEEP DISTURBANCE: 0
HEADACHES: 0
LIGHT-HEADEDNESS: 0
UNEXPECTED WEIGHT CHANGE: 0
COUGH: 1
CHOKING: 0
SPEECH DIFFICULTY: 0
CONFUSION: 0
APNEA: 0
BACK PAIN: 0
DIZZINESS: 0
BLOOD IN STOOL: 0
WHEEZING: 0
CHEST TIGHTNESS: 0
ABDOMINAL PAIN: 0
DIARRHEA: 0
DIAPHORESIS: 0
ACTIVITY CHANGE: 0
WEAKNESS: 0
CONSTIPATION: 0

## 2023-10-17 ASSESSMENT — PATIENT HEALTH QUESTIONNAIRE - PHQ9
2. FEELING DOWN, DEPRESSED OR HOPELESS: SEVERAL DAYS
SUM OF ALL RESPONSES TO PHQ9 QUESTIONS 1 AND 2: 1
SUM OF ALL RESPONSES TO PHQ9 QUESTIONS 1 AND 2: 1
1. LITTLE INTEREST OR PLEASURE IN DOING THINGS: NOT AT ALL
CLINICAL INTERPRETATION OF PHQ2 SCORE: NO FURTHER SCREENING NEEDED

## 2023-10-17 ASSESSMENT — PAIN SCALES - GENERAL: PAINLEVEL: 0

## 2023-10-18 LAB
EBV EA IGG SER-ACNC: 0.2 AI
EBV NA IGG SER-ACNC: >8 AI
EBV VCA IGG SER-ACNC: >8 AI
EBV VCA IGM SER-ACNC: <0.2 AI

## 2023-10-22 LAB — PHYTONADIONE SERPL-MCNC: 0.71 NMOL/L (ref 0.22–4.88)

## 2024-03-01 ENCOUNTER — APPOINTMENT (OUTPATIENT)
Dept: CT IMAGING | Facility: HOSPITAL | Age: 44
End: 2024-03-01
Attending: EMERGENCY MEDICINE
Payer: MEDICARE

## 2024-03-01 ENCOUNTER — HOSPITAL ENCOUNTER (EMERGENCY)
Facility: HOSPITAL | Age: 44
Discharge: HOME | End: 2024-03-01
Attending: EMERGENCY MEDICINE
Payer: MEDICARE

## 2024-03-01 VITALS
HEART RATE: 53 BPM | OXYGEN SATURATION: 100 % | DIASTOLIC BLOOD PRESSURE: 90 MMHG | RESPIRATION RATE: 16 BRPM | WEIGHT: 160 LBS | SYSTOLIC BLOOD PRESSURE: 156 MMHG | TEMPERATURE: 98 F | HEIGHT: 67 IN | BODY MASS INDEX: 25.11 KG/M2

## 2024-03-01 DIAGNOSIS — R42 DIZZINESS: Primary | ICD-10-CM

## 2024-03-01 DIAGNOSIS — R51.9 HEADACHE, UNSPECIFIED HEADACHE TYPE: ICD-10-CM

## 2024-03-01 LAB
ALBUMIN SERPL-MCNC: 4.1 G/DL (ref 3.4–5)
ALBUMIN/GLOB SERPL: 1.2 {RATIO} (ref 1–2)
ALP LIVER SERPL-CCNC: 62 U/L
ALT SERPL-CCNC: 26 U/L
ANION GAP SERPL CALC-SCNC: 4 MMOL/L (ref 0–18)
AST SERPL-CCNC: 43 U/L (ref 15–37)
ATRIAL RATE: 66 BPM
BASOPHILS # BLD AUTO: 0.01 X10(3) UL (ref 0–0.2)
BASOPHILS NFR BLD AUTO: 0.1 %
BILIRUB SERPL-MCNC: 0.5 MG/DL (ref 0.1–2)
BUN BLD-MCNC: 15 MG/DL (ref 9–23)
CALCIUM BLD-MCNC: 9 MG/DL (ref 8.5–10.1)
CHLORIDE SERPL-SCNC: 110 MMOL/L (ref 98–112)
CO2 SERPL-SCNC: 23 MMOL/L (ref 21–32)
CREAT BLD-MCNC: 1.03 MG/DL
EGFRCR SERPLBLD CKD-EPI 2021: 92 ML/MIN/1.73M2 (ref 60–?)
EOSINOPHIL # BLD AUTO: 0.01 X10(3) UL (ref 0–0.7)
EOSINOPHIL NFR BLD AUTO: 0.1 %
ERYTHROCYTE [DISTWIDTH] IN BLOOD BY AUTOMATED COUNT: 11.9 %
GLOBULIN PLAS-MCNC: 3.4 G/DL (ref 2.8–4.4)
GLUCOSE BLD-MCNC: 101 MG/DL (ref 70–99)
HCT VFR BLD AUTO: 41.9 %
HGB BLD-MCNC: 14.7 G/DL
IMM GRANULOCYTES # BLD AUTO: 0.06 X10(3) UL (ref 0–1)
IMM GRANULOCYTES NFR BLD: 0.8 %
LYMPHOCYTES # BLD AUTO: 1.09 X10(3) UL (ref 1–4)
LYMPHOCYTES NFR BLD AUTO: 14 %
MCH RBC QN AUTO: 30.7 PG (ref 26–34)
MCHC RBC AUTO-ENTMCNC: 35.1 G/DL (ref 31–37)
MCV RBC AUTO: 87.5 FL
MONOCYTES # BLD AUTO: 0.18 X10(3) UL (ref 0.1–1)
MONOCYTES NFR BLD AUTO: 2.3 %
NEUTROPHILS # BLD AUTO: 6.42 X10 (3) UL (ref 1.5–7.7)
NEUTROPHILS # BLD AUTO: 6.42 X10(3) UL (ref 1.5–7.7)
NEUTROPHILS NFR BLD AUTO: 82.7 %
OSMOLALITY SERPL CALC.SUM OF ELEC: 285 MOSM/KG (ref 275–295)
P AXIS: 56 DEGREES
P-R INTERVAL: 178 MS
PLATELET # BLD AUTO: 214 10(3)UL (ref 150–450)
POTASSIUM SERPL-SCNC: 3.9 MMOL/L (ref 3.5–5.1)
PROT SERPL-MCNC: 7.5 G/DL (ref 6.4–8.2)
Q-T INTERVAL: 394 MS
QRS DURATION: 98 MS
QTC CALCULATION (BEZET): 413 MS
R AXIS: -16 DEGREES
RBC # BLD AUTO: 4.79 X10(6)UL
SODIUM SERPL-SCNC: 137 MMOL/L (ref 136–145)
T AXIS: 38 DEGREES
TROPONIN I SERPL HS-MCNC: 8 NG/L
VENTRICULAR RATE: 66 BPM
WBC # BLD AUTO: 7.8 X10(3) UL (ref 4–11)

## 2024-03-01 PROCEDURE — 80053 COMPREHEN METABOLIC PANEL: CPT | Performed by: EMERGENCY MEDICINE

## 2024-03-01 PROCEDURE — 70496 CT ANGIOGRAPHY HEAD: CPT | Performed by: EMERGENCY MEDICINE

## 2024-03-01 PROCEDURE — 84484 ASSAY OF TROPONIN QUANT: CPT | Performed by: EMERGENCY MEDICINE

## 2024-03-01 PROCEDURE — 93005 ELECTROCARDIOGRAM TRACING: CPT

## 2024-03-01 PROCEDURE — 99284 EMERGENCY DEPT VISIT MOD MDM: CPT

## 2024-03-01 PROCEDURE — 96360 HYDRATION IV INFUSION INIT: CPT

## 2024-03-01 PROCEDURE — 85025 COMPLETE CBC W/AUTO DIFF WBC: CPT | Performed by: EMERGENCY MEDICINE

## 2024-03-01 PROCEDURE — 70498 CT ANGIOGRAPHY NECK: CPT | Performed by: EMERGENCY MEDICINE

## 2024-03-01 PROCEDURE — 96361 HYDRATE IV INFUSION ADD-ON: CPT

## 2024-03-01 RX ORDER — MECLIZINE HYDROCHLORIDE 25 MG/1
25 TABLET ORAL 3 TIMES DAILY PRN
Qty: 20 TABLET | Refills: 0 | Status: SHIPPED | OUTPATIENT
Start: 2024-03-01

## 2024-03-01 NOTE — ED PROVIDER NOTES
Patient Seen in: [facility] Emergency Department      History     Chief Complaint   Patient presents with    Dizziness     Stated Complaint: headache , dizziness for 1 month    Subjective:   HPI    Patient with a history of intermittent mild leukopenia.  He follows up with hematology for this.  Patient also followed up with cardiology for a questionable syncopal episode.  Patient reports that these episodes are now controlled with vitamin B-12 supplements and his complaints are different than what he experienced then.    Patient's symptoms have been going on for about a month now.  Patient describes a pressure sensation in his head especially behind the right temple.  Patient also feels dizzy.  He describes a sensation of feeling like he is falling when he closes his eyes and the same type of feeling which is worse with sudden movements.  The pressure sensation in his head is been nearly constant since onset.  Patient reports that he did follow-up with his primary care provider.  Patient reports that he had a trial of antibiotic.  He had CAT scans of his sinuses.  He had an MRI scan of his brain that was reportedly normal.  He was started on prednisone and is just finishing this up after a week course.  He also reports being started on Topamax.  He presents to the emergency department today because none of these interventions have helped.  No high fever or shaking chills.  No cold or cough.  No vomiting but he has had diarrhea intermittently for the last month.  No black or bloody stool.  No urinary symptoms or flank pain.  No calf pain or swelling.  No edema.  No focal numbness or weakness, incoordination, or clumsiness.  Patient reports that he was referred to a neurologist but does not have an appointment for another month.  He did not want to see his mother's neurologist      Objective:   Past Medical History:   Diagnosis Date    Abdominal hernia     Abdominal pain     Arthritis     Back pain     Chest  pain     Chronic back pain 1997    Constipation     Diarrhea, unspecified     Extrinsic asthma, unspecified     Exercise induced    Food intolerance     Frequent use of laxatives     History of depression     Irregular bowel habits     Nausea     Osteoporosis     Pain with bowel movements     Sleep disturbance     Wears glasses     Weight gain               Past Surgical History:   Procedure Laterality Date    HERNIA SURGERY                  Social History     Socioeconomic History    Marital status:    Tobacco Use    Smoking status: Never    Smokeless tobacco: Never   Substance and Sexual Activity    Alcohol use: No   Other Topics Concern    Exercise No    Seat Belt Yes              Review of Systems    Positive for stated complaint: headache , dizziness for 1 month  Other systems are as noted in HPI.  Constitutional and vital signs reviewed.      All other systems reviewed and negative except as noted above.    Physical Exam     ED Triage Vitals [03/01/24 1135]   /90   Pulse 79   Resp 18   Temp 97.7 °F (36.5 °C)   Temp src Oral   SpO2 98 %   O2 Device None (Room air)       Current:/90   Pulse 53   Temp 97.7 °F (36.5 °C) (Oral)   Resp 16   Ht 170.2 cm (5' 7\")   Wt 72.6 kg   SpO2 100%   BMI 25.06 kg/m²         Physical Exam  General: The patient is awake, alert, conversant.  His speech is clear.  There is facial symmetry  Eyes: sclera white, conjunctiva pink and moist.  Lids and lashes are normal.  Pupils are equal, round, and reactive.  Extraocular movements intact.  Throat: Posterior pharynx is normal.  Oromucosa lips are moist  Neck: With no JVD  Lungs: Clear to auscultation bilaterally.  No rhonchi or rales.  Heart: Normal S1 and S2, without murmur.  Distal pulses are strong and symmetric.  Abdomen: Soft, nondistended.  Completely nontender  Extremities: Unremarkable.  Calves nonswollen, symmetric, nontender.  No pedal edema.  Skin: Not particularly pale  Neurologic:  Mental status as  above.  Cranial nerves as noted above.  Patient moves all extremities with good strength.  Finger-to-nose and heel-to-shin coordination intact.  Seated Romberg negative.  Gait is steady.  Alternating movements intact.         ED Course     Labs Reviewed   COMP METABOLIC PANEL (14) - Abnormal; Notable for the following components:       Result Value    Glucose 101 (*)     AST 43 (*)     All other components within normal limits   TROPONIN I HIGH SENSITIVITY - Normal   CBC WITH DIFFERENTIAL WITH PLATELET    Narrative:     The following orders were created for panel order CBC With Differential With Platelet.  Procedure                               Abnormality         Status                     ---------                               -----------         ------                     CBC W/ DIFFERENTIAL[[id number]]                              Final result                 Please view results for these tests on the individual orders.   RAINBOW DRAW LAVENDER   RAINBOW DRAW LIGHT GREEN   CBC W/ DIFFERENTIAL                    Cardiology note from a year ago:    History of Present Illness:  Tree Ramsay is a 42 year old man with PMHx of who I saw in consultation at the request of Dr Fournier for questionable syncope.   He complains of exercise induced coughing.  After he gets home following exercising he sits on his couch and feels extremely tired, then \"passes out\".  This has happened a few times over the past month.  He says he is then \"out\" for 30 minutes to as long as a few hours.  He has never fallen or hurt himself.  He denies seizure activity or incontinence.    Echo was normal.  Event monitor from 3/23 was notable for frequent symptoms of palpitations, SOB and at least one episode of syncope.   Strips showed sinus or sinus tachycardia at normal rates, rare ectopy.   There was no sustained arrhythmia and there were no pauses or bradycardia.    He denies CP or angina.  He has palpitations, SOB, cough with exercise and  syncope    Assessment/Plan:  Tree Ramsay is a 42 year old man who I saw in consultation at the request of Dr Fournier for questionable syncope.   He complains of exercise induced coughing.  After he gets home following exercising he sits on his couch and feels extremely tired, then \"passes out\".  This has happened a few times over the past month.  He says he is then \"out\" for 30 minutes to as long as a few hours.  He has never fallen or hurt himself.  He denies seizure activity or incontinence.    Echo was normal.  Event monitor from 3/23 was notable for frequent symptoms of palpitations, SOB and at least one episode of syncope.   Strips showed sinus or sinus tachycardia at normal rates, rare ectopy.   There was no sustained arrhythmia and there were no pauses or bradycardia.       Based on his clinical story and his normal cardiac workup to date I do not believe that he has arrhythmia causing his issues.  I think he might be falling asleep on his couch.   I told him he should see neurology for workup for narcolepsy / absence seizures.      Zio patch from less than 1 year ago:  RESULTS:   Normal study   Sinus throughout, average HR was 60 bpm   HR range was 41 to 134 bpm   No pauses   No vent ectopy.   Rare insignificant atrial ectopy < 1%.  No runs of NSVT.      No significant ectopy and no runs of arrhythmia     24-hour blood pressure monitoring from less than a year ago:  Conclusion:   1. Normal average daytime blood pressure (126/77).   2.  Normal daytime systolic blood pressure variability.   3.  Normal nocturnal blood pressure (126/75).  There was lack of an   appropriate nocturnal decline however.   4.  Episode of \"heavy heartbeat \"unaccompanied by any significant changes   in heart rate.  The highest blood pressure during the 24-hour period was   noted at this time.   5.  No evidence of whitecoat effect   6.  No episodes of hypotension.     Echocardiogram from less than a year ago  SUMMARY:   1. Left  ventricle: The cavity size is normal. Wall thickness is normal.      Systolic function is normal. The ejection fraction was measured by biplane      method of disks. The ejection fraction is 67%.   2. Right ventricle: The cavity size is normal. Systolic function is normal.     Cardiac event monitor from less than a year ago  Conclusion:   Sinus rhythm or sinus tachycardia throughout the study.   No arrhythmia or   significant ectopy was present to account for frequent symptoms of   palpitations, fluttering, dizziness and SOB     An EKG was performed. I agree with computerized EKG interval interpretations. EKG shows sinus rhythm. There are no acute ST changes to suggest acute ischemia or infarct  Ventricular rate 66 bpm. GA interval 178 ms. QRS duration 98 ms.         MDM      Patient with vague pressure sensation in his head unresponsive to a round of antibiotic and steroids with reportedly normal sinus CT and MRI scan ongoing now for a month  Etiology is unclear.  Reassuring if MRI report did not show any extraordinary abnormality.  Patient had recent extensive cardiac workup as noted above    A peripheral vertigo would be included in the differential seeing as patient's symptoms are definitely positional  Orthostatic hypotension considered in the differential but patient's symptoms are not really lightheadedness    CBC shows normal white count, hemoglobin, and platelets  Metabolic panel  Troponin    Radiology report from [facility] CT 2/14 scan face/jaw shows sinuses appearing clear with hypodense focus in the left basal ganglia/thalamus of unclear etiology  MRI scan with and without contrast from February 21 showed cystic structures which may be prominent perivascular spaces or neurogenic cysts according to the radiologist.  \"No characteristics suspicious for malignancy \".  Otherwise unremarkable MRI scan of the brain.    CTA head and neck  I personally reviewed the actual radiographs themselves  and my individual interpretation shows no aneurysm  radiologist's formal interpretation which I have reviewed  CONCLUSION:       1. No hemorrhage or extra-axial fluid collection.  A left perivascular space in the basal ganglia is noted.  No significant parenchymal abnormality in the brain.     2. No significant stenosis or aneurysmal dilatation involving the major arterial structures in the head and neck.           I reviewed the results of the workup with the patient.  At this point, I believe patient may be safely discharged home.  I recommend:  Contact [facility] today to arrange follow-up with neurology  Consider follow-up with ENT specialist  Tylenol or Motrin for pain  Rest and drink plenty of liquids      Patient may even benefit from physical therapy.  This can be arranged by his primary care provider.  I will have him on a trial of Antivert    Patient and his father are in agreement the plan                             Medical Decision Making      Disposition and Plan     Clinical Impression:  1. Dizziness    2. Headache, unspecified headache type         Disposition:  Discharge  3/1/2024  3:40 pm    Follow-up:  [address]  [address] [address] [phone]  Call  choose option 1 for general neurology    Kriss Lai MD  [address] [place] [address] [address]  [facility] [address]  [phone]    Call today            Medications Prescribed:  Current Discharge Medication List        START taking these medications    Details   meclizine 25 MG Oral Tab Take 1 tablet (25 mg total) by mouth 3 (three) times daily as needed.  Qty: 20 tablet, Refills: 0                                             Solaraze Counseling:  I discussed with the patient the risks of Solaraze including but not limited to erythema, scaling, itching, weeping, crusting, and pain.

## 2024-03-01 NOTE — DISCHARGE INSTRUCTIONS
Contact [facility] today to arrange follow-up with neurology  Consider follow-up with ENT specialist  Tylenol or Motrin for pain  Rest and drink plenty of liquids        Neurologic Instructions    Call your doctor if you have:  increased pain or headache.   trouble seeing, walking or using your arms or legs.   dizziness or passing out.   any change in behavior (agitated or sleepy).   trouble being awakened from sleep.   numbness in your face, arm or leg.   extreme weakness.   trouble talking.   nausea and vomiting.   any new or severe symptoms.    Take your medicines as prescribed. Most important, see a doctor again as discussed. If you have problems that we have not discussed, call or visit your doctor right away. If you cannot reach your doctor, return to the Emergency Department.

## 2024-03-01 NOTE — ED INITIAL ASSESSMENT (HPI)
Dizzness, headache/pressure x1 months. States that approx 1-2 weeks ago he did have a syncopal episode where he woke up on the ground.

## 2024-03-06 ENCOUNTER — OFFICE VISIT (OUTPATIENT)
Dept: NEUROLOGY | Facility: CLINIC | Age: 44
End: 2024-03-06
Payer: COMMERCIAL

## 2024-03-06 VITALS
SYSTOLIC BLOOD PRESSURE: 124 MMHG | DIASTOLIC BLOOD PRESSURE: 70 MMHG | HEART RATE: 82 BPM | WEIGHT: 171 LBS | RESPIRATION RATE: 16 BRPM | BODY MASS INDEX: 27 KG/M2

## 2024-03-06 DIAGNOSIS — R51.9 NONINTRACTABLE HEADACHE, UNSPECIFIED CHRONICITY PATTERN, UNSPECIFIED HEADACHE TYPE: Primary | ICD-10-CM

## 2024-03-06 DIAGNOSIS — R42 DIZZINESS: ICD-10-CM

## 2024-03-06 PROCEDURE — 99244 OFF/OP CNSLTJ NEW/EST MOD 40: CPT | Performed by: OTHER

## 2024-03-06 PROCEDURE — 3078F DIAST BP <80 MM HG: CPT | Performed by: OTHER

## 2024-03-06 PROCEDURE — 3074F SYST BP LT 130 MM HG: CPT | Performed by: OTHER

## 2024-03-06 RX ORDER — TOPIRAMATE 25 MG/1
25 TABLET ORAL DAILY
COMMUNITY
Start: 2024-02-21

## 2024-03-06 RX ORDER — MULTIVITAMIN
TABLET ORAL
COMMUNITY

## 2024-03-06 NOTE — PATIENT INSTRUCTIONS
Instructions from Dr. Garzon:       Increase topiramate to 50 mg at bedtime for 1 week, then 75 mg at bedtime for 1 week, then (if needed) 100 mg at bedtime.         Refill policies:    Allow 2-3 business days for refills; controlled substances may take longer.  Contact your pharmacy at least 5 days prior to running out of medication and have them send an electronic request or submit request through the “request refill” option in your [employer] account.  Refills are not addressed on weekends; covering physicians do not authorize routine medications on weekends.  No narcotics or controlled substances are refilled after noon on Fridays or by on call physicians.  By law, narcotics must be electronically prescribed.  A 30 day supply with no refills is the maximum allowed.  If your prescription is due for a refill, you may be due for a follow up appointment.  To best provide you care, patients receiving routine medications need to be seen at least once a year.  Patients receiving narcotic/controlled substance medications need to be seen at least once every 3 months.  In the event that your preferred pharmacy does not have the requested medication in stock (e.g. Backordered), it is your responsibility to find another pharmacy that has the requested medication available.  We will gladly send a new prescription to that pharmacy at your request.    Scheduling Tests:    If your physician has ordered radiology tests such as MRI or CT scans, please contact Central Scheduling at [phone] right away to schedule the test.  Once scheduled, the [facility] Centralized Referral Team will work with your insurance carrier to obtain pre-certification or prior authorization.  Depending on your insurance carrier, approval may take 3-10 days.  It is highly recommended patients assure they have received an authorization before having a test performed.  If test is done without insurance authorization, patient may be responsible for the entire  amount billed.      Precertification and Prior Authorizations:  If your physician has recommended that you have a procedure or additional testing performed the [facility] Centralized Referral Team will contact your insurance carrier to obtain pre-certification or prior authorization.    You are strongly encouraged to contact your insurance carrier to verify that your procedure/test has been approved and is a COVERED benefit.  Although the [facility] Centralized Referral Team does its due diligence, the insurance carrier gives the disclaimer that \"Although the procedure is authorized, this does not guarantee payment.\"    Ultimately the patient is responsible for payment.   Thank you for your understanding in this matter.  Paperwork Completion:  If you require FMLA or disability paperwork for your recovery, please make sure to either drop it off or have it faxed to our office at [phone]. Be sure the form has your name and date of birth on it.  The form will be faxed to our Forms Department and they will complete it for you.  There is a 25$ fee for all forms that need to be filled out.  Please be aware there is a 10-14 day turnaround time.  You will need to sign a release of information (YURIY) form if your paperwork does not come with one.  You may call the Forms Department with any questions at [phone].  Their fax number is [phone].

## 2024-03-06 NOTE — PROGRESS NOTES
Neurology New History & Physical    CHIEF COMPLAINT / REASON FOR VISIT:    Chief Complaint   Patient presents with    Neurologic Problem     Right frontal region or right temporal has head pressure.     Dizziness     Dizziness occurs constantly.      HISTORY OBTAINED FROM:  Patient and others as above  Data review (see below)    HISTORY OF PRESENT ILLNESS:  Tree Ramsay is a 43 year old male with about 1 month of daily headaches (5-6/10 in severity) and head pressure (5-7/10 but rarely 9/10 in severity).  He describes these as two separate things, but can occur together.  Can last minutes or hours or all day.  The pressure is behind right temple or right occipital.  Headache is holocephalic, pounding.  Dizziness occurs constantly, he describes it to be worse with movement, especially with head turn (either side).  So he has to turn his head slowly.  No spinning, but does have a falling sensation if he closes his eyes.  No faint or LH feeling.  No p/p/n/v.  No autonomic symptoms.    Tylenol is used every 4-12 hours, only partially effective (sometimes does take pain away).  Meclizine for dizziness does not consistently help.  Started TPM 25 mg HS about 2-3 weeks ago, unclear if helping.  Also tried prednisone taper, again unclear benefit.    No numbness in limbs.  Has mild generalized weakness.  No hearing or vision changes.  Has slight imbalance, but is ok as long as he is careful.      Previous medication trials:  TPM - not effective    DATA REVIEWED:  As documented in the HPI    March 2024    ER note - seen for dizziness, head pressure     CTA h/n unremarkable (I independently analyzed and interpreted this, and I agree with the reading physician's report.)    MRI brain 2/21/24 unremarkable other than prominent perivascular space    CBC, CMP unremarkable     NEUROLOGICAL FAMILY HISTORY:  Mother vertigo  Niece has migraines    PAST MEDICAL HISTORY:  Past Medical History:   Diagnosis Date    Abdominal hernia      Abdominal pain     Arthritis     Back pain     Chest pain     Chronic back pain 1997    Constipation     Diarrhea, unspecified     Extrinsic asthma, unspecified     Exercise induced    Food intolerance     Frequent use of laxatives     History of depression     Irregular bowel habits     Nausea     Osteoporosis     Pain with bowel movements     Sleep disturbance     Wears glasses     Weight gain        PAST SURGICAL HISTORY:  Past Surgical History:   Procedure Laterality Date    HERNIA SURGERY         SOCIAL HISTORY:  Social History     Socioeconomic History    Marital status:    Tobacco Use    Smoking status: Never    Smokeless tobacco: Never   Vaping Use    Vaping Use: Never used   Substance and Sexual Activity    Alcohol use: No    Drug use: Never   Other Topics Concern    Caffeine Concern Yes     Comment: once a week    Exercise No     Comment: daily    Seat Belt Yes       ALLERGIES:  Allergies   Allergen Reactions    Cymbalta [Duloxetine Hcl] NAUSEA AND VOMITING    Ultram [Tramadol]        CURRENT MEDICATIONS:   topiramate 25 MG Oral Tab Take 1 tablet (25 mg total) by mouth daily.      Multiple Vitamin (MULTI-VITAMIN DAILY) Oral Tab Take by mouth.      Cyanocobalamin (VITAMIN B-12 OR) Take by mouth.      meclizine 25 MG Oral Tab Take 1 tablet (25 mg total) by mouth 3 (three) times daily as needed. 20 tablet 0       REVIEW OF SYSTEMS:  Patient did not have additional neurological, psychiatric, respiratory, cardiovascular, gastrointestinal, or genitourinary symptoms.    PHYSICAL EXAMINATION:  /70   Pulse 82   Resp 16   Wt 171 lb (77.6 kg)   BMI 26.78 kg/m²     Gen: WDWN, in NAD  MSE: AAOx3, nl language, nl attn/conc, nl fund of knowledge  CN: II - PERRL, VFF; Ill, IV, VI - EOMI, no nystagmus; V - nl facial sensation bilaterally; VII - nl facial mvmt bilaterally; VIII - nl hearing bilaterally; IX - nl palate elevation bilaterally; X - nl voice; XI - nl shoulder shrug b/I; XII - nl tongue  movement  Motor: 5/5 x4; no drift; normal tone; no abnormal movements  Sensory: nl vibration x4  Coord: nl FTN b/I  Reflex: 2+ bilaterally in upper and lower extremities  Gait: normal gait, unremarkable romberg         ASSESSMENT & PLAN:      ICD-10-CM    1. Nonintractable headache, unspecified chronicity pattern, unspecified headache type  R51.9       2. Dizziness  R42         Nonspecific headaches and dizziness.  Neuroimaging has been unremarkable.  I advised to titrate up TPM to 100 mg HS.  ENT pending tmrw.    We discussed medication side effects. Patient verbalized understanding and agreement.    Return in about 2 months (around 5/6/2024).       To summarize medical decision making:  Problems:  I addressed at least 1 or more chronic problems with exacerbations or side effects  Data:  Extensive (any 2+ categories).  I reviewed external notes, reviewed test results, (at least 3 unique of the above) and independently interpreted tests   Risk:  There is Moderate risk of morbidity from diagnostic testing or treatment, due to prescription drug management    This consultation was requested by Rd Meek for evaluation of above concerns.  Report is being routed to requesting provider.    Celia Garzon MD, FAES, FAAN  Board-Certified in Neurology, Epilepsy, and Clinical Neurophysiology  [facility]

## 2024-03-15 ENCOUNTER — OFFICE VISIT (OUTPATIENT)
Facility: LOCATION | Age: 44
End: 2024-03-15
Payer: COMMERCIAL

## 2024-03-15 DIAGNOSIS — G44.021 INTRACTABLE CHRONIC CLUSTER HEADACHE: Primary | ICD-10-CM

## 2024-03-15 PROCEDURE — 99204 OFFICE O/P NEW MOD 45 MIN: CPT | Performed by: OTOLARYNGOLOGY

## 2024-03-15 NOTE — PROGRESS NOTES
Tree Ramsay is a 43 year old male. No chief complaint on file.    HPI:   43-year-old white male for now a couple of months has had daily right-sided headache pressure is goes all the way to the occipital part of the head.  He has been tried on antibiotics Flonase prednisone x 2.  He seen another ear nose and throat doctor had an audiogram which was normal was also having some dizziness specifically not vertigo.  He has some history of sinus problems in the past.  The sinus problems have been gone for the last 5 years.  His workup to date has included the following:  CT scan of face 2/14/2024 normal  MRI scan of the head and brain 2/21/2024 normal  Carotid CT 3/1/2024 normal  Previous ear nose and throat consultation audiogram and consultation normal  Previous neurology consultation normal    Current Outpatient Medications   Medication Sig Dispense Refill    topiramate 25 MG Oral Tab Take 1 tablet (25 mg total) by mouth daily.      Multiple Vitamin (MULTI-VITAMIN DAILY) Oral Tab Take by mouth.      Cyanocobalamin (VITAMIN B-12 OR) Take by mouth.      meclizine 25 MG Oral Tab Take 1 tablet (25 mg total) by mouth 3 (three) times daily as needed. 20 tablet 0      Past Medical History:   Diagnosis Date    Abdominal hernia     Abdominal pain     Arthritis     Back pain     Chest pain     Chronic back pain 1997    Constipation     Diarrhea, unspecified     Extrinsic asthma, unspecified     Exercise induced    Food intolerance     Frequent use of laxatives     History of depression     Irregular bowel habits     Nausea     Osteoporosis     Pain with bowel movements     Sleep disturbance     Wears glasses     Weight gain       Social History:  Social History     Socioeconomic History    Marital status:    Tobacco Use    Smoking status: Never    Smokeless tobacco: Never   Vaping Use    Vaping Use: Never used   Substance and Sexual Activity    Alcohol use: No    Drug use: Never   Other Topics Concern    Caffeine Concern  Yes     Comment: once a week    Exercise No     Comment: daily    Seat Belt Yes      Past Surgical History:   Procedure Laterality Date    HERNIA SURGERY           REVIEW OF SYSTEMS:   GENERAL HEALTH: feels well otherwise  GENERAL : denies fever, chills, sweats, weight loss, weight gain  SKIN: denies any unusual skin lesions or rashes  RESPIRATORY: denies shortness of breath with exertion  NEURO: denies headaches    EXAM:   There were no vitals taken for this visit.    System Pertinent findings Details   Constitutional  Overall appearance - Normal.   Psychiatric  Orientation - Oriented to time, place, person & situation. Appropriate mood and affect.   Head/Face  Facial features -- Normal. Skull - Normal.   Eyes  Pupils equal ,round ,react to light and accomidate   Ears  External Ear Right: Normal, Left: Normal. Canal - Right: Normal, Left: Normal. TM - Right: Normal left: Normal   Nose  External Nose, Normal, Septum -deviated to the left nasal Vault, clear,Turbinates - Right: Normal left: Normal   Mouth/Throat  Lips/teeth/gums - Normal. Tonsils -1+. Oropharynx - Normal.   Neck Exam  Inspection - Normal. Palpation - Normal. Parotid gland - Normal. Thyroid gland -normal   Neurological  Cranial nerves - Cranial nerves II through XII grossly intact.   Nasopharynx   Normal        Lymph Detail  Submental. Submandibular. Anterior cervical. Posterior cervical. Supraclavicular.       ASSESSMENT AND PLAN:   1. Intractable chronic cluster headache  Think this is either a cluster headache or migraine headache recommended another neurology consultation Dr. Bustamante      The patient indicates understanding of these issues and agrees to the plan.      Job Jones MD  3/15/2024  12:46 PM

## 2024-03-26 ENCOUNTER — APPOINTMENT (OUTPATIENT)
Dept: CARDIOLOGY | Age: 44
End: 2024-03-26

## 2024-03-27 ENCOUNTER — APPOINTMENT (OUTPATIENT)
Dept: CARDIOLOGY | Age: 44
End: 2024-03-27

## 2024-04-25 ENCOUNTER — OFFICE VISIT (OUTPATIENT)
Dept: NEUROLOGY | Facility: CLINIC | Age: 44
End: 2024-04-25
Payer: MEDICARE

## 2024-04-25 VITALS
BODY MASS INDEX: 26.21 KG/M2 | WEIGHT: 167 LBS | HEART RATE: 73 BPM | RESPIRATION RATE: 16 BRPM | SYSTOLIC BLOOD PRESSURE: 138 MMHG | HEIGHT: 67 IN | DIASTOLIC BLOOD PRESSURE: 80 MMHG

## 2024-04-25 DIAGNOSIS — R42 DIZZINESS: ICD-10-CM

## 2024-04-25 DIAGNOSIS — R51.9 NONINTRACTABLE HEADACHE, UNSPECIFIED CHRONICITY PATTERN, UNSPECIFIED HEADACHE TYPE: Primary | ICD-10-CM

## 2024-04-25 PROCEDURE — 99213 OFFICE O/P EST LOW 20 MIN: CPT | Performed by: PHYSICIAN ASSISTANT

## 2024-04-25 RX ORDER — TOPIRAMATE 50 MG/1
50 TABLET, FILM COATED ORAL NIGHTLY
COMMUNITY
End: 2024-04-25 | Stop reason: DRUGHIGH

## 2024-04-25 RX ORDER — TOPIRAMATE 25 MG/1
TABLET ORAL
Qty: 90 TABLET | Refills: 3 | Status: SHIPPED | OUTPATIENT
Start: 2024-04-25

## 2024-04-25 RX ORDER — UBROGEPANT 100 MG/1
TABLET ORAL
Qty: 3 TABLET | Refills: 0 | COMMUNITY
Start: 2024-04-25 | End: 2025-04-25

## 2024-04-25 NOTE — PROGRESS NOTES
NEUROLOGY  [facility]       Previous visit and existing record notes reviewed in preparation for the face to face visit.  Relevant labs and studies reviewed and will be noted in relevant areas of this record.    Tree Ramsay  7/2/1980  Primary Care Provider:  Ryley Bunn MD    4/25/2024  43 year old male      Patient was recently seen by Dr. Garzon at the beginning of March for his headaches/dizziness.    Seen for/plans last visit:  Headache    Accompanied visit: Mother    Present condition:  Currently on prednisone taper for headaches/dizziness  Patient describes a pressure in the head/headache x 3 months  Can be in the right temple or side of the head. Not on the left side. The headache can be generalized as well The headaches are daily  No nausea. No vomiting. No phonophobia, minimal photophobia. Was started on topamax by pcp and has been taking it for 2 months. He has not seen a difference. No neck pain. Has some neck stiffness. Stretching the neck will sometimes help the head pain. No hx of therapy for the neck or head  Has used excedrin with improvement  Will have blurry vision that comes and goes.   Dizziness- when standing quickly, notices it when exercising- seems like when he gets heart rate up that symptoms increase. This was occurring prior to starting the topamax  No spinning sensation, He is staying hydrated  He was seen by cardiology and had evaluation without explanation for symptoms  Has seen pulmonologist for evaluation without explanations  He was sent to vestibular therapy. But did not help  Hx of TBI in 1997  No recent concussion  Started the prednisone 2 days ago this is his third round of prednisone  No hx of headaches after the head injury in 1997  Maternal grandmother had migraines  No paresthesias  Has imitrex and maxalt from pcp. Thinks the imitrex has helped      Blood Pressure  Lying Down 160/83- 71  Standing up 151/94-76    CTA Brain/Neck(3/1/24)    Impression    CONCLUSION:       1. No hemorrhage or extra-axial fluid collection.  A left perivascular space in the basal ganglia is noted.  No significant parenchymal abnormality in the brain.     2. No significant stenosis or aneurysmal dilatation involving the major arterial structures in the head and neck.         Event Monitor (3/31/23)    Conclusion:   Sinus rhythm or sinus tachycardia throughout the study.   No arrhythmia or   significant ectopy was present to account for frequent symptoms of   palpitations, fluttering, dizziness and SOB.         Review of Systems:  Review of Systems:  Denies systemic symptoms     No CP or SOB.  No GI or  symptoms. Relevant Neuro as noted above.        Medications:      Current Outpatient Medications:     topiramate 25 MG Oral Tab, Take 3 tabs po qhs, Disp: 90 tablet, Rfl: 3    ubrogepant (UBRELVY) 100 MG Oral Tab, Take one tablet at onset of migraine.  May take additional tablet in 2 hours if needed.  Do not exceed two tablets per 24 hour period., Disp: 3 tablet, Rfl: 0    Multiple Vitamin (MULTI-VITAMIN DAILY) Oral Tab, Take by mouth., Disp: , Rfl:     Cyanocobalamin (VITAMIN B-12 OR), Take by mouth., Disp: , Rfl:   PRN:     Allergies:  Allergies   Allergen Reactions    Cymbalta [Duloxetine Hcl] NAUSEA AND VOMITING    Ultram [Tramadol]           EXAM:  /80 (BP Location: Left arm, Patient Position: Sitting, Cuff Size: adult)   Pulse 73   Resp 16   Ht 67\"   Wt 167 lb (75.8 kg)   BMI 26.16 kg/m²   Looks stated age  General Exam:  HENT:  pink conjunctiva anicteric sclerae  Neck no adenopathy, thyroid normal  Heart and Lungs:  normal  Extremities: no cyanosis, skin changes    NEURO  NAD, A&Ox3  EOMI  CN II-XII intact  Strength 5/5 all extremities  DTR symmetric  FTN intact bilaterally  No drift on exam  Normal gait  Romberg negative    Problem/s Identified this visit:   1. Nonintractable headache, unspecified chronicity pattern, unspecified headache type    2. Dizziness           Discussion plus Diagnostics & Treatment Orders:    Patient is a 40 year old male here with hx of TBI in 1997 here with complaints of persistent daily headaches and dizziness that is more of a lightheadedness. He has completed a CTA Brain and Neck which was unremarkable. He tells me that he completed an MRI Brain which was normal but do not have results with him. Recommended increasing the Topamax to 75mg nightly for headache prevention and trial of ubrelvy for abortive tx. Regarding lightheadedness recommended checking bp at time of symptoms and monitoring bp daily as it is elevated today. If greater than 140/90 then will need to follow-up with pcp.He expressed full understanding    (X) Discussed potential side effects of any treatment relevant to above.  Includes explanation of tests as necessary.    Return in about 3 months (around 7/25/2024).      Patient understands that if needed, based on condition and or test results, follow up will be readjusted    Shelly Hernandez PA-C  [facility]  4/25/2024, Time completed 2:43 PM

## 2024-05-01 ENCOUNTER — APPOINTMENT (OUTPATIENT)
Dept: CARDIOLOGY | Age: 44
End: 2024-05-01

## 2024-07-16 ENCOUNTER — OFFICE VISIT (OUTPATIENT)
Dept: NEUROLOGY | Facility: CLINIC | Age: 44
End: 2024-07-16
Payer: MEDICARE

## 2024-07-16 ENCOUNTER — TELEPHONE (OUTPATIENT)
Dept: NEUROLOGY | Facility: CLINIC | Age: 44
End: 2024-07-16

## 2024-07-16 VITALS
BODY MASS INDEX: 26.21 KG/M2 | WEIGHT: 167 LBS | DIASTOLIC BLOOD PRESSURE: 76 MMHG | SYSTOLIC BLOOD PRESSURE: 114 MMHG | RESPIRATION RATE: 16 BRPM | HEIGHT: 67 IN | HEART RATE: 61 BPM

## 2024-07-16 DIAGNOSIS — G43.709 TRANSFORMED MIGRAINE WITHOUT AURA: ICD-10-CM

## 2024-07-16 DIAGNOSIS — R51.9 NONINTRACTABLE HEADACHE, UNSPECIFIED CHRONICITY PATTERN, UNSPECIFIED HEADACHE TYPE: Primary | ICD-10-CM

## 2024-07-16 PROCEDURE — 99214 OFFICE O/P EST MOD 30 MIN: CPT | Performed by: OTHER

## 2024-07-16 RX ORDER — RIMEGEPANT SULFATE 75 MG/75MG
75 TABLET, ORALLY DISINTEGRATING ORAL AS NEEDED
Qty: 8 TABLET | Refills: 5 | Status: SHIPPED | OUTPATIENT
Start: 2024-07-16 | End: 2025-07-16

## 2024-07-16 RX ORDER — TIZANIDINE 2 MG/1
2 TABLET ORAL 2 TIMES DAILY
Qty: 60 TABLET | Refills: 5 | Status: SHIPPED | OUTPATIENT
Start: 2024-07-16

## 2024-07-16 RX ORDER — ALBUTEROL SULFATE 90 UG/1
2 AEROSOL, METERED RESPIRATORY (INHALATION) EVERY 6 HOURS PRN
COMMUNITY
Start: 2024-04-15

## 2024-07-16 RX ORDER — RIMEGEPANT SULFATE 75 MG/75MG
75 TABLET, ORALLY DISINTEGRATING ORAL AS NEEDED
Qty: 2 TABLET | Refills: 0 | COMMUNITY
Start: 2024-07-16 | End: 2025-07-16

## 2024-07-16 NOTE — TELEPHONE ENCOUNTER
Patient was advised to complete Botox and needs a prior authorization.    Call patient when botox prior authorization is completed to schedule appointment.

## 2024-07-16 NOTE — PROGRESS NOTES
NEUROLOGY  [facility]       Tree Ramsay  7/2/1980  Primary Care Provider:  Ryley Bunn MD    7/16/2024  44 year old yo,  was last seen on:: April    Seen for/plans last visit:  Headaches chronic    Previous visit and existing record notes reviewed in preparation for the face to face visit.  Relevant labs and studies reviewed and will be noted in relevant areas of this record.  Accompanied visit:     (x) No.      Present condition:  He continues to have daily headaches    Right sided headaches temporal and back of the head. The pain is like a pressure and sometime wakes with them    The pain is there and is slight upon waking and in an hour or 2 starts taking excedrin.  It makes him functional but does not get rid of the pain    Takes excedrin every 4 hours and is not bothered  Mostly stays on the right side      If unable to take excedrin in 6-8 hours the pain gets unbearable; unable to focus to drive     Took Ubrelvy and did not help  Other than that mostly tried OTC before  No preventative tried  Does not actually recall taking Imitrex or Maxalt    \"Dizziness\" - still gets the spells often related to moving of head and body    Past History update/new problem(s): as noted above  TBI in 1997 messed up his back and was on Fentanyl for many years.  Weaned off on his own 5 years ago        Review of Systems:  Review of Systems:  Denies systemic symptoms.     No CP or SOB.  No GI or  symptoms. Relevant Neuro as noted above.      Medications:      Current Outpatient Medications:     albuterol 108 (90 Base) MCG/ACT Inhalation Aero Soln, Inhale 2 puffs into the lungs every 6 (six) hours as needed., Disp: , Rfl:     tiZANidine 2 MG Oral Tab, Take 1 tablet (2 mg total) by mouth in the morning and 1 tablet (2 mg total) before bedtime., Disp: 60 tablet, Rfl: 5    Rimegepant Sulfate (NURTEC) 75 MG Oral Tablet Dispersible, Take 75 mg by mouth as needed. Take one tablet at onset of migraine.  Maximum  dose in 24 hours is 1 tablet (75mg)., Disp: 8 tablet, Rfl: 5    Multiple Vitamin (MULTI-VITAMIN DAILY) Oral Tab, Take by mouth., Disp: , Rfl:     Cyanocobalamin (VITAMIN B-12 OR), Take by mouth., Disp: , Rfl:   PRN:     Allergies:  Allergies   Allergen Reactions    Cymbalta [Duloxetine Hcl] NAUSEA AND VOMITING    Ultram [Tramadol]           EXAM:  /76 (BP Location: Left arm, Patient Position: Sitting, Cuff Size: adult)   Pulse 61   Resp 16   Ht 67\"   Wt 167 lb (75.8 kg)   BMI 26.16 kg/m²   Looks stated age  General Exam:  HENT:  pink conjunctiva anicteric sclerae  Neck no adenopathy, thyroid normal  Heart and Lungs:  normal  Extremities: no cyanosis, skin changes    NEURO  NEURO  Able to relate events with fluent speech and intact comprehension  CN 2-12: pupils reactive, VF full face symmetric sensation and movement tongue midline  No motor focal findings  Sensory: no lateralizing findings  Reflexes are symmetric  UMN signs: none  Gait: narrow based          INTERPRETATION of RELEVANT LABS and other DATA:          Problem/s Identified this visit:   1. Nonintractable headache, unspecified chronicity pattern, unspecified headache type    2. Transformed migraine without aura          Discussion plus Diagnostics & Treatment Orders:  Orders Placed This Encounter    albuterol 108 (90 Base) MCG/ACT Inhalation Aero Soln     Sig: Inhale 2 puffs into the lungs every 6 (six) hours as needed.    tiZANidine 2 MG Oral Tab     Sig: Take 1 tablet (2 mg total) by mouth in the morning and 1 tablet (2 mg total) before bedtime.     Dispense:  60 tablet     Refill:  5    Rimegepant Sulfate (NURTEC) 75 MG Oral Tablet Dispersible     Sig: Take 75 mg by mouth as needed. Take one tablet at onset of migraine.  Maximum dose in 24 hours is 1 tablet (75mg).     Dispense:  8 tablet     Refill:  5     Get Botox Approval    (x) Discussed potential side effects of any treatment relevant to above.  Includes explanation of tests as  necessary.      Patient understands that if needed, based on condition and or test results, follow up will be readjusted      Arie Cifuentes MD  Vascular & General Neurology  Director, Multiple Sclerosis Program  [facility]  7/16/2024, Time completed 11:09 AM    Decision making:  ( x ) labs reviewed/ordered - 1  (  ) new diagnosis: - 1  ( x) Images & studies independently reviewed -non F2F  (  ) Case/studies discussed with other caregivers - -non F2F  (  ) Telephone time with patiern or authorized Fam member--non F2F  ( x ) other records reviewed --non F2F including consultations  (  ) [facility] meetings - patient not present --non F2F  (  ) Independent Historian obtained    Non Face to Face CPT code 29946/11117 applies as documented above    PROCEDURE DONE     (   ) see notes        After visit, patient was escorted out and handed-off discharge process and instructions to the check out desk.  No additional issues relevant to visit were raised to staff at this time interval.        This document is to be interpreted as my current opinion regarding the case as of the stated date of service based on the information available to me at this time and may supersedes any prior opinion expressed either orally or in writing.  Services rendered are only within the scope of direct medical care  Sometimes, reports may have been prepared partially using a speech recognition software technology.  If a word or phrase is confusing or out of context, please do not hesitate to call for clarification.

## 2024-07-16 NOTE — TELEPHONE ENCOUNTER
PA requested for [facility]  Clinical questions answered and submitted to insurance  Awaiting coverage determination

## 2024-07-16 NOTE — TELEPHONE ENCOUNTER
Received fax from [employer] employee program   Approval received for patient use of Nurtec   Approval granted: 4/17/24-7/16/25        Pt notified

## 2024-07-16 NOTE — PATIENT INSTRUCTIONS
Refill policies:    Allow 2-3 business days for refills; controlled substances may take longer.  Contact your pharmacy at least 5 days prior to running out of medication and have them send an electronic request or submit request through the “request refill” option in your [employer] account.  Refills are not addressed on weekends; covering physicians do not authorize routine medications on weekends.  No narcotics or controlled substances are refilled after noon on Fridays or by on call physicians.  By law, narcotics must be electronically prescribed.  A 30 day supply with no refills is the maximum allowed.  If your prescription is due for a refill, you may be due for a follow up appointment.  To best provide you care, patients receiving routine medications need to be seen at least once a year.  Patients receiving narcotic/controlled substance medications need to be seen at least once every 3 months.  In the event that your preferred pharmacy does not have the requested medication in stock (e.g. Backordered), it is your responsibility to find another pharmacy that has the requested medication available.  We will gladly send a new prescription to that pharmacy at your request.    Scheduling Tests:    If your physician has ordered radiology tests such as MRI or CT scans, please contact Central Scheduling at [phone] right away to schedule the test.  Once scheduled, the [facility] Centralized Referral Team will work with your insurance carrier to obtain pre-certification or prior authorization.  Depending on your insurance carrier, approval may take 3-10 days.  It is highly recommended patients assure they have received an authorization before having a test performed.  If test is done without insurance authorization, patient may be responsible for the entire amount billed.      Precertification and Prior Authorizations:  If your physician has recommended that you have a procedure or additional testing performed the [facility]  Centralized Referral Team will contact your insurance carrier to obtain pre-certification or prior authorization.    You are strongly encouraged to contact your insurance carrier to verify that your procedure/test has been approved and is a COVERED benefit.  Although the [facility] Centralized Referral Team does its due diligence, the insurance carrier gives the disclaimer that \"Although the procedure is authorized, this does not guarantee payment.\"    Ultimately the patient is responsible for payment.   Thank you for your understanding in this matter.  Paperwork Completion:  If you require FMLA or disability paperwork for your recovery, please make sure to either drop it off or have it faxed to our office at [phone]. Be sure the form has your name and date of birth on it.  The form will be faxed to our Forms Department and they will complete it for you.  There is a 25$ fee for all forms that need to be filled out.  Please be aware there is a 10-14 day turnaround time.  You will need to sign a release of information (YURIY) form if your paperwork does not come with one.  You may call the Forms Department with any questions at [phone].  Their fax number is [phone].

## 2024-07-24 NOTE — TELEPHONE ENCOUNTER
Called [facility] at [phone] for Botox authorization.  Spoke to Alesia ref#[id number].  Per Alesia Medicare is primary insurance.    With Medicare there is no prior authorization required for Botox.      Once 200U of Botox is available, please call patient to schedule appointment.  Route back to the PA team.

## 2024-10-15 ENCOUNTER — APPOINTMENT (OUTPATIENT)
Dept: LAB | Age: 44
End: 2024-10-15
Attending: INTERNAL MEDICINE

## 2024-10-15 ENCOUNTER — APPOINTMENT (OUTPATIENT)
Dept: HEMATOLOGY/ONCOLOGY | Age: 44
End: 2024-10-15
Attending: INTERNAL MEDICINE

## 2024-10-16 DIAGNOSIS — D70.0 CONGENITAL NEUTROPENIA  (CMD): Primary | ICD-10-CM

## 2024-10-17 ENCOUNTER — OFFICE VISIT (OUTPATIENT)
Dept: HEMATOLOGY/ONCOLOGY | Age: 44
End: 2024-10-17
Attending: INTERNAL MEDICINE

## 2024-10-17 ENCOUNTER — LAB SERVICES (OUTPATIENT)
Dept: LAB | Age: 44
End: 2024-10-17
Attending: INTERNAL MEDICINE

## 2024-10-17 VITALS
WEIGHT: 174 LBS | RESPIRATION RATE: 18 BRPM | DIASTOLIC BLOOD PRESSURE: 76 MMHG | BODY MASS INDEX: 27.31 KG/M2 | HEIGHT: 67 IN | HEART RATE: 72 BPM | SYSTOLIC BLOOD PRESSURE: 136 MMHG | OXYGEN SATURATION: 97 % | TEMPERATURE: 98.4 F

## 2024-10-17 DIAGNOSIS — E53.8 B12 DEFICIENCY: ICD-10-CM

## 2024-10-17 DIAGNOSIS — D70.0 CONGENITAL NEUTROPENIA  (CMD): ICD-10-CM

## 2024-10-17 DIAGNOSIS — E78.5 ELEVATED LIPIDS: ICD-10-CM

## 2024-10-17 DIAGNOSIS — Z15.89 MTHFR GENE MUTATION: Primary | ICD-10-CM

## 2024-10-17 DIAGNOSIS — R73.9 HYPERGLYCEMIA: ICD-10-CM

## 2024-10-17 DIAGNOSIS — E83.10 DISORDER OF IRON METABOLISM, UNSPECIFIED: ICD-10-CM

## 2024-10-17 DIAGNOSIS — D70.0 CONGENITAL NEUTROPENIA  (CMD): Primary | ICD-10-CM

## 2024-10-17 DIAGNOSIS — Z15.89 MTHFR GENE MUTATION: ICD-10-CM

## 2024-10-17 LAB
ALBUMIN SERPL-MCNC: 4.1 G/DL (ref 3.4–5)
ALBUMIN/GLOB SERPL: 1.2 {RATIO} (ref 1–2.4)
ALP SERPL-CCNC: 61 UNITS/L (ref 45–117)
ALT SERPL-CCNC: 18 UNITS/L
ANION GAP SERPL CALC-SCNC: 10 MMOL/L (ref 7–19)
AST SERPL-CCNC: 13 UNITS/L
BASOPHILS # BLD: 0 K/MCL (ref 0–0.3)
BASOPHILS NFR BLD: 1 %
BILIRUB SERPL-MCNC: 0.3 MG/DL (ref 0.2–1)
BUN SERPL-MCNC: 19 MG/DL (ref 6–20)
BUN/CREAT SERPL: 18 (ref 7–25)
CALCIUM SERPL-MCNC: 9.2 MG/DL (ref 8.4–10.2)
CHLORIDE SERPL-SCNC: 106 MMOL/L (ref 97–110)
CO2 SERPL-SCNC: 27 MMOL/L (ref 21–32)
CREAT SERPL-MCNC: 1.07 MG/DL (ref 0.67–1.17)
DEPRECATED RDW RBC: 38.6 FL (ref 39–50)
EGFRCR SERPLBLD CKD-EPI 2021: 87 ML/MIN/{1.73_M2}
EOSINOPHIL # BLD: 0.1 K/MCL (ref 0–0.5)
EOSINOPHIL NFR BLD: 2 %
ERYTHROCYTE [DISTWIDTH] IN BLOOD: 11.7 % (ref 11–15)
FASTING DURATION TIME PATIENT: NORMAL H
FOLATE SERPL-MCNC: 11.3 NG/ML
GLOBULIN SER-MCNC: 3.3 G/DL (ref 2–4)
GLUCOSE SERPL-MCNC: 98 MG/DL (ref 70–99)
HCT VFR BLD CALC: 40.3 % (ref 39–51)
HCYS SERPL-SCNC: 6.5 MCMOL/L
HGB BLD-MCNC: 13.5 G/DL (ref 13–17)
IMM GRANULOCYTES # BLD AUTO: 0 K/MCL (ref 0–0.2)
IMM GRANULOCYTES # BLD: 0 %
LYMPHOCYTES # BLD: 1.5 K/MCL (ref 1–4.8)
LYMPHOCYTES NFR BLD: 35 %
MCH RBC QN AUTO: 30.3 PG (ref 26–34)
MCHC RBC AUTO-ENTMCNC: 33.5 G/DL (ref 32–36.5)
MCV RBC AUTO: 90.4 FL (ref 78–100)
MONOCYTES # BLD: 0.4 K/MCL (ref 0.3–0.9)
MONOCYTES NFR BLD: 9 %
NEUTROPHILS # BLD: 2.3 K/MCL (ref 1.8–7.7)
NEUTROPHILS NFR BLD: 53 %
NRBC BLD MANUAL-RTO: 0 /100 WBC
PLATELET # BLD AUTO: 226 K/MCL (ref 140–450)
POTASSIUM SERPL-SCNC: 4.3 MMOL/L (ref 3.4–5.1)
PROT SERPL-MCNC: 7.4 G/DL (ref 6.4–8.2)
RAINBOW EXTRA TUBES HOLD SPECIMEN: NORMAL
RBC # BLD: 4.46 MIL/MCL (ref 4.5–5.9)
SODIUM SERPL-SCNC: 139 MMOL/L (ref 135–145)
VIT B12 SERPL-MCNC: >2000 PG/ML (ref 211–911)
WBC # BLD: 4.4 K/MCL (ref 4.2–11)

## 2024-10-17 PROCEDURE — 83090 ASSAY OF HOMOCYSTEINE: CPT

## 2024-10-17 PROCEDURE — 99211 OFF/OP EST MAY X REQ PHY/QHP: CPT

## 2024-10-17 PROCEDURE — 80053 COMPREHEN METABOLIC PANEL: CPT

## 2024-10-17 PROCEDURE — 85025 COMPLETE CBC W/AUTO DIFF WBC: CPT

## 2024-10-17 PROCEDURE — 36415 COLL VENOUS BLD VENIPUNCTURE: CPT

## 2024-10-17 PROCEDURE — 86340 INTRINSIC FACTOR ANTIBODY: CPT

## 2024-10-17 PROCEDURE — 82607 VITAMIN B-12: CPT

## 2024-10-17 RX ORDER — RIZATRIPTAN BENZOATE 10 MG/1
TABLET, ORALLY DISINTEGRATING ORAL
COMMUNITY
Start: 2024-04-23

## 2024-10-17 RX ORDER — FAMOTIDINE 20 MG/1
20 TABLET, FILM COATED ORAL 2 TIMES DAILY
COMMUNITY
Start: 2024-09-11

## 2024-10-17 ASSESSMENT — ENCOUNTER SYMPTOMS
TROUBLE SWALLOWING: 0
LIGHT-HEADEDNESS: 0
BRUISES/BLEEDS EASILY: 0
WHEEZING: 0
FEVER: 0
WEAKNESS: 0
DIARRHEA: 0
ACTIVITY CHANGE: 0
SHORTNESS OF BREATH: 0
CONSTIPATION: 0
CHOKING: 0
SLEEP DISTURBANCE: 0
CONFUSION: 0
BLOOD IN STOOL: 0
SPEECH DIFFICULTY: 0
ADENOPATHY: 0
NAUSEA: 0
DIAPHORESIS: 0
BACK PAIN: 0
CHEST TIGHTNESS: 0
VOMITING: 0
COUGH: 1
ABDOMINAL PAIN: 0
ABDOMINAL DISTENTION: 0
APNEA: 0
VOICE CHANGE: 0
APPETITE CHANGE: 0
UNEXPECTED WEIGHT CHANGE: 0
DIZZINESS: 0
CHILLS: 0
HEADACHES: 0
FATIGUE: 0

## 2024-10-17 ASSESSMENT — PATIENT HEALTH QUESTIONNAIRE - PHQ9
CLINICAL INTERPRETATION OF PHQ2 SCORE: NO FURTHER SCREENING NEEDED
SUM OF ALL RESPONSES TO PHQ9 QUESTIONS 1 AND 2: 0
SUM OF ALL RESPONSES TO PHQ9 QUESTIONS 1 AND 2: 0
2. FEELING DOWN, DEPRESSED OR HOPELESS: NOT AT ALL
1. LITTLE INTEREST OR PLEASURE IN DOING THINGS: NOT AT ALL

## 2024-10-17 ASSESSMENT — PAIN SCALES - GENERAL: PAINLEVEL: 4

## 2024-10-18 LAB — HBA1C MFR BLD: 5.7 % (ref 4.5–5.6)

## 2024-10-19 LAB — IF BLOCK AB SER QL RIA: NEGATIVE

## 2024-10-21 ENCOUNTER — TELEPHONE (OUTPATIENT)
Dept: HEMATOLOGY/ONCOLOGY | Age: 44
End: 2024-10-21

## 2024-11-13 ENCOUNTER — OFFICE VISIT (OUTPATIENT)
Dept: NEUROLOGY | Facility: CLINIC | Age: 44
End: 2024-11-13
Payer: MEDICARE

## 2024-11-13 VITALS
HEART RATE: 74 BPM | HEIGHT: 67 IN | BODY MASS INDEX: 26.21 KG/M2 | SYSTOLIC BLOOD PRESSURE: 146 MMHG | WEIGHT: 167 LBS | DIASTOLIC BLOOD PRESSURE: 76 MMHG | RESPIRATION RATE: 16 BRPM

## 2024-11-13 DIAGNOSIS — G44.40 REBOUND HEADACHE: ICD-10-CM

## 2024-11-13 DIAGNOSIS — R51.9 NONINTRACTABLE HEADACHE, UNSPECIFIED CHRONICITY PATTERN, UNSPECIFIED HEADACHE TYPE: Primary | ICD-10-CM

## 2024-11-13 DIAGNOSIS — G43.709 TRANSFORMED MIGRAINE WITHOUT AURA: ICD-10-CM

## 2024-11-13 PROCEDURE — 99214 OFFICE O/P EST MOD 30 MIN: CPT | Performed by: OTHER

## 2024-11-13 NOTE — PATIENT INSTRUCTIONS
Refill policies:    Allow 2-3 business days for refills; controlled substances may take longer.  Contact your pharmacy at least 5 days prior to running out of medication and have them send an electronic request or submit request through the “request refill” option in your [employer] account.  Refills are not addressed on weekends; covering physicians do not authorize routine medications on weekends.  No narcotics or controlled substances are refilled after noon on Fridays or by on call physicians.  By law, narcotics must be electronically prescribed.  A 30 day supply with no refills is the maximum allowed.  If your prescription is due for a refill, you may be due for a follow up appointment.  To best provide you care, patients receiving routine medications need to be seen at least once a year.  Patients receiving narcotic/controlled substance medications need to be seen at least once every 3 months.  In the event that your preferred pharmacy does not have the requested medication in stock (e.g. Backordered), it is your responsibility to find another pharmacy that has the requested medication available.  We will gladly send a new prescription to that pharmacy at your request.    Scheduling Tests:    If your physician has ordered radiology tests such as MRI or CT scans, please contact Central Scheduling at [phone] right away to schedule the test.  Once scheduled, the [facility] Centralized Referral Team will work with your insurance carrier to obtain pre-certification or prior authorization.  Depending on your insurance carrier, approval may take 3-10 days.  It is highly recommended patients assure they have received an authorization before having a test performed.  If test is done without insurance authorization, patient may be responsible for the entire amount billed.      Precertification and Prior Authorizations:  If your physician has recommended that you have a procedure or additional testing performed the [facility]  Centralized Referral Team will contact your insurance carrier to obtain pre-certification or prior authorization.    You are strongly encouraged to contact your insurance carrier to verify that your procedure/test has been approved and is a COVERED benefit.  Although the [facility] Centralized Referral Team does its due diligence, the insurance carrier gives the disclaimer that \"Although the procedure is authorized, this does not guarantee payment.\"    Ultimately the patient is responsible for payment.   Thank you for your understanding in this matter.  Paperwork Completion:  If you require FMLA or disability paperwork for your recovery, please make sure to either drop it off or have it faxed to our office at [phone]. Be sure the form has your name and date of birth on it.  The form will be faxed to our Forms Department and they will complete it for you.  There is a 25$ fee for all forms that need to be filled out.  Please be aware there is a 10-14 day turnaround time.  You will need to sign a release of information (YURIY) form if your paperwork does not come with one.  You may call the Forms Department with any questions at [phone].  Their fax number is [phone].

## 2024-11-13 NOTE — PROGRESS NOTES
NEUROLOGY  [facility]       Tree Ramsay  7/2/1980  Primary Care Provider:  Ryley Bunn MD    11/13/2024  44 year old yo,  was last seen on:: July 2024    Seen for/plans last visit:  For transformed HA and DIzziness    Previous visit and existing record notes reviewed in preparation for the face to face visit.  Relevant labs and studies reviewed and will be noted in relevant areas of this record.  Accompanied visit:     (x) No.      Present condition:  Had been having diarrhea issues  Dizziness has not been an issue    The headaches are still there but manageable  Headaches are daily  Takes Tizanidine and Excedrin (6 a day)    NURTEC tried 1-2 times and things got HECTIC and forgot. 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  No GI or  symptoms. Relevant Neuro as noted above.      Medications:      Current Outpatient Medications:     albuterol 108 (90 Base) MCG/ACT Inhalation Aero Soln, Inhale 2 puffs into the lungs every 6 (six) hours as needed., Disp: , Rfl:     tiZANidine 2 MG Oral Tab, Take 1 tablet (2 mg total) by mouth in the morning and 1 tablet (2 mg total) before bedtime., Disp: 60 tablet, Rfl: 5    Rimegepant Sulfate (NURTEC) 75 MG Oral Tablet Dispersible, Take 75 mg by mouth as needed. Take one tablet at onset of migraine.  Maximum dose in 24 hours is 1 tablet (75mg)., Disp: 8 tablet, Rfl: 5    Multiple Vitamin (MULTI-VITAMIN DAILY) Oral Tab, Take by mouth., Disp: , Rfl:     Cyanocobalamin (VITAMIN B-12 OR), Take by mouth., Disp: , Rfl:   PRN:     Allergies:  Allergies[1]       EXAM:  /76 (BP Location: Left arm, Patient Position: Sitting, Cuff Size: large)   Pulse 74   Resp 16   Ht 67\"   Wt 167 lb (75.8 kg)   BMI 26.16 kg/m²   Looks stated age  General Exam:  HENT:  pink conjunctiva  anicteric sclerae  Neck no adenopathy, thyroid normal  Heart and Lungs:  normal  Extremities: no cyanosis, skin changes    NEURO  NEURO  Able to relate events with fluent speech and intact comprehension  CN 2-12: pupils reactive, VF full face symmetric sensation and movement tongue midline  No motor focal findings  Sensory: no lateralizing findings  Reflexes are symmetric  UMN signs: none  Gait: narrow based          INTERPRETATION of RELEVANT LABS and other DATA:          Problem/s Identified this visit:   1. Nonintractable headache, unspecified chronicity pattern, unspecified headache type    2. Transformed migraine without aura    3. Rebound headache          Discussion plus Diagnostics & Treatment Orders:  Advised to try to taper off Excedrin which he uses 6 pills a day  Start off by choosing the day of the week that he reduces the dose until he does not take it.  I advised him that unless he gets off the excessive use of Excedrin he will not have a chance of getting better.    NURTEC to be tried during those off days as well  Sample of REYVOW given  WARNED NOT TO DRIVE as it may cause drowsiness when used    May consider BOTOX as preventative as well    (x) Discussed potential side effects of any treatment relevant to above.  Includes explanation of tests as necessary.    Return in about 3 months (around 2/13/2025).      Patient understands that if needed, based on condition and or test results, follow up will be readjusted      Arie Cifuentes MD  Vascular & General Neurology  Director, Multiple Sclerosis Program  [facility]  11/13/2024, Time completed 1:52 PM    Decision making:  ( x ) labs reviewed/ordered - 1  (  ) new diagnosis: - 1  ( x) Images & studies independently reviewed -non F2F  (  ) Case/studies discussed with other caregivers - -non F2F  (  ) Telephone time with patiern or authorized [facility] member--non F2F  ( x ) other records reviewed --non F2F including consultations  (  ) Fam  meetings - patient not present --non F2F  (  ) Independent Historian obtained    Non Face to Face CPT code 94629/33461 applies as documented above    PROCEDURE DONE     (   ) see notes        After visit, patient was escorted out and handed-off discharge process and instructions to the check out desk.  No additional issues relevant to visit were raised to staff at this time interval.        This document is to be interpreted as my current opinion regarding the case as of the stated date of service based on the information available to me at this time and may supersedes any prior opinion expressed either orally or in writing.  Services rendered are only within the scope of direct medical care  Sometimes, reports may have been prepared partially using a speech recognition software technology.  If a word or phrase is confusing or out of context, please do not hesitate to call for clarification.              [1]   Allergies  Allergen Reactions    Cymbalta [Duloxetine Hcl] NAUSEA AND VOMITING    Ultram [Tramadol]

## 2025-05-15 ENCOUNTER — OFFICE VISIT (OUTPATIENT)
Dept: NEUROLOGY | Facility: CLINIC | Age: 45
End: 2025-05-15
Payer: MEDICARE

## 2025-05-15 VITALS
HEIGHT: 67 IN | BODY MASS INDEX: 26.21 KG/M2 | RESPIRATION RATE: 16 BRPM | HEART RATE: 79 BPM | SYSTOLIC BLOOD PRESSURE: 131 MMHG | DIASTOLIC BLOOD PRESSURE: 81 MMHG | WEIGHT: 167 LBS

## 2025-05-15 DIAGNOSIS — G43.709 TRANSFORMED MIGRAINE WITHOUT AURA: ICD-10-CM

## 2025-05-15 DIAGNOSIS — R51.9 NONINTRACTABLE HEADACHE, UNSPECIFIED CHRONICITY PATTERN, UNSPECIFIED HEADACHE TYPE: Primary | ICD-10-CM

## 2025-05-15 DIAGNOSIS — G44.40 REBOUND HEADACHE: ICD-10-CM

## 2025-05-15 PROCEDURE — 99214 OFFICE O/P EST MOD 30 MIN: CPT | Performed by: OTHER

## 2025-05-15 RX ORDER — SOY PROTEIN
POWDER (GRAM) ORAL
COMMUNITY
Start: 2024-05-28

## 2025-05-15 RX ORDER — TIZANIDINE 2 MG/1
2 TABLET ORAL NIGHTLY
COMMUNITY

## 2025-05-15 RX ORDER — RIMEGEPANT SULFATE 75 MG/75MG
75 TABLET, ORALLY DISINTEGRATING ORAL AS NEEDED
Qty: 16 TABLET | Refills: 5 | Status: SHIPPED | OUTPATIENT
Start: 2025-05-15 | End: 2026-05-15

## 2025-05-15 RX ORDER — VITAMIN K2 90 MCG
CAPSULE ORAL
COMMUNITY

## 2025-05-15 RX ORDER — AMITRIPTYLINE HYDROCHLORIDE 75 MG/1
75 TABLET ORAL NIGHTLY
COMMUNITY
Start: 2025-05-07

## 2025-05-15 NOTE — PATIENT INSTRUCTIONS
Refill policies:    Allow 2-3 business days for refills; controlled substances may take longer.  Contact your pharmacy at least 5 days prior to running out of medication and have them send an electronic request or submit request through the “request refill” option in your [employer] account.  Refills are not addressed on weekends; covering physicians do not authorize routine medications on weekends.  No narcotics or controlled substances are refilled after noon on Fridays or by on call physicians.  By law, narcotics must be electronically prescribed.  A 30 day supply with no refills is the maximum allowed.  If your prescription is due for a refill, you may be due for a follow up appointment.  To best provide you care, patients receiving routine medications need to be seen at least once a year.  Patients receiving narcotic/controlled substance medications need to be seen at least once every 3 months.  In the event that your preferred pharmacy does not have the requested medication in stock (e.g. Backordered), it is your responsibility to find another pharmacy that has the requested medication available.  We will gladly send a new prescription to that pharmacy at your request.    Scheduling Tests:    If your physician has ordered radiology tests such as MRI or CT scans, please contact Central Scheduling at [phone] right away to schedule the test.  Once scheduled, the [facility] Centralized Referral Team will work with your insurance carrier to obtain pre-certification or prior authorization.  Depending on your insurance carrier, approval may take 3-10 days.  It is highly recommended patients assure they have received an authorization before having a test performed.  If test is done without insurance authorization, patient may be responsible for the entire amount billed.      Precertification and Prior Authorizations:  If your physician has recommended that you have a procedure or additional testing performed the [facility]  Centralized Referral Team will contact your insurance carrier to obtain pre-certification or prior authorization.    You are strongly encouraged to contact your insurance carrier to verify that your procedure/test has been approved and is a COVERED benefit.  Although the [facility] Centralized Referral Team does its due diligence, the insurance carrier gives the disclaimer that \"Although the procedure is authorized, this does not guarantee payment.\"    Ultimately the patient is responsible for payment.   Thank you for your understanding in this matter.  Paperwork Completion:  If you require FMLA or disability paperwork for your recovery, please make sure to either drop it off or have it faxed to our office at [phone]. Be sure the form has your name and date of birth on it.  The form will be faxed to our Forms Department and they will complete it for you.  There is a 25$ fee for all forms that need to be filled out.  Please be aware there is a 10-14 day turnaround time.  You will need to sign a release of information (YURIY) form if your paperwork does not come with one.  You may call the Forms Department with any questions at [phone].  Their fax number is [phone].

## 2025-05-15 NOTE — PROGRESS NOTES
NEUROLOGY  [facility]       Tree Ramsay  7/2/1980  Primary Care Provider:  Ryley Bunn MD    5/15/2025  44 year old yo,  was last seen on:: November 2024    Seen for/plans last visit:  Chronic headache    Previous visit and existing record notes reviewed in preparation for the face to face visit.  Relevant labs and studies reviewed and will be noted in relevant areas of this record.  Accompanied visit:     (x) No.      Present condition:  3 months ago started on Amitriptyline 25 mg ===> now 75 mg   Missed a day on amitriptyline and HA came back hard    Has less Excedrin use  but still BID  Only abortive treatment is Excedrin          Past History update/new problem(s): as above    Review of Systems:  Review of Systems:  Denies systemic symptoms     No CP or SOB.  No GI or  symptoms. Relevant Neuro as noted above.      Medications:    Medications - Current[1]  PRN: PRN Medications[2]    Allergies:  Allergies[3]       EXAM:  /81 (BP Location: Left arm, Patient Position: Sitting, Cuff Size: large)   Pulse 79   Resp 16   Ht 67\"   Wt 167 lb (75.8 kg)   BMI 26.16 kg/m²   Looks stated age  General Exam:  HENT:  pink conjunctiva anicteric sclerae  Neck no adenopathy, thyroid normal  Heart and Lungs:  normal  Extremities: no cyanosis, skin changes    NEURO  NEURO  nonlateralizing        INTERPRETATION of RELEVANT LABS and other DATA:          Problem/s Identified this visit:   1. Nonintractable headache, unspecified chronicity pattern, unspecified headache type    2. Transformed migraine without aura    3. Rebound headache          Discussion plus Diagnostics & Treatment Orders:  He has not used Nurtec for a while and so now that the Amitriptyline is doing something, retry it    He has excedrin overuse and will allow him to use 2 excedrin with the first dose of nurtec and if that is effective, start using Nurtec alone and \"corey it\" with Excedrin later    Goal is to get to less excedrin  use and perhaps have days he does not take Excedrin at all          (x) Discussed potential side effects of any treatment relevant to above.  Includes explanation of tests as necessary.    Return for Follow up with Shelly.      Patient understands that if needed, based on condition and or test results, follow up will be readjusted      Arie Cifuentes MD  Vascular & General Neurology  Director, Multiple Sclerosis Program  [facility]  5/15/2025, Time completed 2:44 PM    Decision making:  ( x ) labs reviewed/ordered - 1  (  ) new diagnosis: - 1  ( x) Images & studies independently reviewed -non F2F  (  ) Case/studies discussed with other caregivers - -non F2F  (  ) Telephone time with patiern or authorized Fam member--non F2F  ( x ) other records reviewed --non F2F including consultations  (  ) Fam meetings - patient not present --non F2F  (  ) Independent Historian obtained    Non Face to Face CPT code 25386/31101 applies as documented above    PROCEDURE DONE     (   ) see notes        After visit, patient was escorted out and handed-off discharge process and instructions to the check out desk.  No additional issues relevant to visit were raised to staff at this time interval.        This document is to be interpreted as my current opinion regarding the case as of the stated date of service based on the information available to me at this time and may supersedes any prior opinion expressed either orally or in writing.  Services rendered are only within the scope of direct medical care  Sometimes, reports may have been prepared partially using a speech recognition software technology.  If a word or phrase is confusing or out of context, please do not hesitate to call for clarification.              [1]   Current Outpatient Medications:     amitriptyline 75 MG Oral Tab, Take 1 tablet (75 mg total) by mouth nightly. TAKE AT BEDTIME, Disp: , Rfl:     tiZANidine 2 MG Oral Tab, Take 1 tablet (2 mg  total) by mouth nightly., Disp: , Rfl:     Cobalamin Combinations (VITAMIN B12-FOLIC ACID) 500-400 MCG Oral Tab, vitamin B12 500 mcg-folic acid 400 mcg tablet, [RxNorm: 659678], Disp: , Rfl:     Levomefolate Glucosamine (METHYL-FOLATE) 1700 MCG Oral Cap, Take by mouth., Disp: , Rfl:     Rimegepant Sulfate (NURTEC) 75 MG Oral Tablet Dispersible, Take 75 mg by mouth as needed. Take one tablet at onset of migraine.  Maximum dose in 24 hours is 1 tablet (75mg)., Disp: 16 tablet, Rfl: 5    albuterol 108 (90 Base) MCG/ACT Inhalation Aero Soln, Inhale 2 puffs into the lungs every 6 (six) hours as needed., Disp: , Rfl:     tiZANidine 2 MG Oral Tab, Take 1 tablet (2 mg total) by mouth in the morning and 1 tablet (2 mg total) before bedtime., Disp: 60 tablet, Rfl: 5    Multiple Vitamin (MULTI-VITAMIN DAILY) Oral Tab, Take by mouth., Disp: , Rfl:     Cyanocobalamin (VITAMIN B-12 OR), Take by mouth., Disp: , Rfl:   [2] [3]   Allergies  Allergen Reactions    Cymbalta [Duloxetine Hcl] NAUSEA AND VOMITING    Ultram [Tramadol]

## 2025-06-13 ENCOUNTER — TELEPHONE (OUTPATIENT)
Dept: NEUROLOGY | Facility: CLINIC | Age: 45
End: 2025-06-13

## 2025-06-13 NOTE — TELEPHONE ENCOUNTER
Received MRI Brain results via fax from [facility], ordered by Dr Ryley Bunn.    Placed on Dr Cifuentes's desk.  Copy to scan.

## 2025-07-29 NOTE — PATIENT INSTRUCTIONS
- Rx given: HC 2.5% twice daily for 2 weeks until smooth.   - Reviewed eczema prevention and trigger avoidance:  no scented soaps/detergents/fabric softeners, recommended frequent emollients and short warm showers   Refill policies:    Allow 2-3 business days for refills; controlled substances may take longer.  Contact your pharmacy at least 5 days prior to running out of medication and have them send an electronic request or submit request through the “request refill” option in your [employer] account.  Refills are not addressed on weekends; covering physicians do not authorize routine medications on weekends.  No narcotics or controlled substances are refilled after noon on Fridays or by on call physicians.  By law, narcotics must be electronically prescribed.  A 30 day supply with no refills is the maximum allowed.  If your prescription is due for a refill, you may be due for a follow up appointment.  To best provide you care, patients receiving routine medications need to be seen at least once a year.  Patients receiving narcotic/controlled substance medications need to be seen at least once every 3 months.  In the event that your preferred pharmacy does not have the requested medication in stock (e.g. Backordered), it is your responsibility to find another pharmacy that has the requested medication available.  We will gladly send a new prescription to that pharmacy at your request.    Scheduling Tests:    If your physician has ordered radiology tests such as MRI or CT scans, please contact Central Scheduling at [phone] right away to schedule the test.  Once scheduled, the [facility] Centralized Referral Team will work with your insurance carrier to obtain pre-certification or prior authorization.  Depending on your insurance carrier, approval may take 3-10 days.  It is highly recommended patients assure they have received an authorization before having a test performed.  If test is done without insurance authorization, patient may be responsible for the entire amount billed.      Precertification and Prior Authorizations:  If your physician has recommended that you have a procedure or additional testing performed the [facility]  Centralized Referral Team will contact your insurance carrier to obtain pre-certification or prior authorization.    You are strongly encouraged to contact your insurance carrier to verify that your procedure/test has been approved and is a COVERED benefit.  Although the [facility] Centralized Referral Team does its due diligence, the insurance carrier gives the disclaimer that \"Although the procedure is authorized, this does not guarantee payment.\"    Ultimately the patient is responsible for payment.   Thank you for your understanding in this matter.  Paperwork Completion:  If you require FMLA or disability paperwork for your recovery, please make sure to either drop it off or have it faxed to our office at [phone]. Be sure the form has your name and date of birth on it.  The form will be faxed to our Forms Department and they will complete it for you.  There is a 25$ fee for all forms that need to be filled out.  Please be aware there is a 10-14 day turnaround time.  You will need to sign a release of information (YURIY) form if your paperwork does not come with one.  You may call the Forms Department with any questions at [phone].  Their fax number is [phone].

## (undated) NOTE — LETTER
Dear colleague,    Thank you very much for the opportunity to see your patient.  Below, please find information from my consult for your patient's recent visit.    I appreciate the chance to take care of your patient with you.  Please feel free to call me with any questions or concerns.    ASSESSMENT & PLAN:      ICD-10-CM    1. Nonintractable headache, unspecified chronicity pattern, unspecified headache type  R51.9       2. Dizziness  R42         Nonspecific headaches and dizziness.  Neuroimaging has been unremarkable.  I advised to titrate up TPM to 100 mg HS.  ENT pending tmrw.    We discussed medication side effects and activity precautions. We discussed symptoms that would warrant urgent/emergent evaluation. Patient verbalized understanding and agreement.    No follow-ups on file.             Sincerely,        Celia Garzon MD